# Patient Record
Sex: FEMALE | Race: AMERICAN INDIAN OR ALASKA NATIVE
[De-identification: names, ages, dates, MRNs, and addresses within clinical notes are randomized per-mention and may not be internally consistent; named-entity substitution may affect disease eponyms.]

---

## 2017-07-20 ENCOUNTER — HOSPITAL ENCOUNTER (EMERGENCY)
Dept: HOSPITAL 5 - ED | Age: 22
Discharge: HOME | End: 2017-07-20
Payer: COMMERCIAL

## 2017-07-20 VITALS — DIASTOLIC BLOOD PRESSURE: 64 MMHG | SYSTOLIC BLOOD PRESSURE: 104 MMHG

## 2017-07-20 DIAGNOSIS — N76.4: Primary | ICD-10-CM

## 2017-07-20 PROCEDURE — 99282 EMERGENCY DEPT VISIT SF MDM: CPT

## 2017-07-20 NOTE — EMERGENCY DEPARTMENT REPORT
- General


Chief complaint: Skin/Abscess/Foreign Body


Stated complaint: VAGINAL CYST


Time Seen by Provider: 07/20/17 11:22


Source: patient


Mode of arrival: Ambulatory


Limitations: No Limitations





- History of Present Illness


Initial comments: 





Patient comes in the ER today with complaints of an abscess to the left side of 

her vaginal area.  Patient denies any drainage, body aches, fever, abdominal 

pain.  Patient states that she has had one of these in the same area before and 

that it had to be drained.


MD complaint: abscess/boil


-: days(s) (5)





- Related Data


 Previous Rx's











 Medication  Instructions  Recorded  Last Taken  Type


 


Cyclobenzaprine [Flexeril] 10 mg PO TID PRN #12 tablet 06/13/17 Unknown Rx


 


Ibuprofen [Motrin] 600 mg PO Q8H PRN #25 tablet 06/13/17 Unknown Rx


 


Cephalexin [Keflex] 500 mg PO TID #30 capsule 07/20/17 Unknown Rx


 


Sulfamethoxazole/Trimethoprim 1 each PO BID #20 tablet 07/20/17 Unknown Rx





[Bactrim DS TAB]    


 


traMADol [Ultram] 50 mg PO Q4HR PRN #20 tablet 07/20/17 Unknown Rx











 Allergies











Allergy/AdvReac Type Severity Reaction Status Date / Time


 


No Known Allergies Allergy   Verified 07/20/17 10:00














Abscess Boil HPI





- HPI


Chief Complaint: Skin/Abscess/Foreign Body


Stated Complaint: VAGINAL CYST


Time Seen by Provider: 07/20/17 11:22


Home Medications: 


 Previous Rx's











 Medication  Instructions  Recorded  Last Taken  Type


 


Cyclobenzaprine [Flexeril] 10 mg PO TID PRN #12 tablet 06/13/17 Unknown Rx


 


Ibuprofen [Motrin] 600 mg PO Q8H PRN #25 tablet 06/13/17 Unknown Rx


 


Cephalexin [Keflex] 500 mg PO TID #30 capsule 07/20/17 Unknown Rx


 


Sulfamethoxazole/Trimethoprim 1 each PO BID #20 tablet 07/20/17 Unknown Rx





[Bactrim DS TAB]    


 


traMADol [Ultram] 50 mg PO Q4HR PRN #20 tablet 07/20/17 Unknown Rx











Allergies/Adverse Reactions: 


 Allergies











Allergy/AdvReac Type Severity Reaction Status Date / Time


 


No Known Allergies Allergy   Verified 07/20/17 10:00














ED Review of Systems


ROS: 


Stated complaint: VAGINAL CYST


Other details as noted in HPI





Constitutional: denies: chills, fever


Eyes: denies: eye pain, eye discharge, vision change


ENT: denies: ear pain, throat pain


Respiratory: denies: cough, shortness of breath, wheezing


Cardiovascular: denies: chest pain, palpitations


Endocrine: no symptoms reported


Gastrointestinal: denies: abdominal pain, nausea, diarrhea


Genitourinary: denies: urgency, dysuria, discharge


Musculoskeletal: denies: back pain, joint swelling, arthralgia


Skin: lesions.  denies: rash


Neurological: denies: headache, weakness, paresthesias


Psychiatric: denies: anxiety, depression


Hematological/Lymphatic: denies: easy bleeding, easy bruising





ED Past Medical Hx





- Past Medical History


Hx Asthma: No





- Surgical History


Past Surgical History?: No





- Social History


Smoking Status: Never Smoker


Substance Use Type: None





- Medications


Home Medications: 


 Home Medications











 Medication  Instructions  Recorded  Confirmed  Last Taken  Type


 


Cyclobenzaprine [Flexeril] 10 mg PO TID PRN #12 tablet 06/13/17  Unknown Rx


 


Ibuprofen [Motrin] 600 mg PO Q8H PRN #25 tablet 06/13/17  Unknown Rx


 


Cephalexin [Keflex] 500 mg PO TID #30 capsule 07/20/17  Unknown Rx


 


Sulfamethoxazole/Trimethoprim 1 each PO BID #20 tablet 07/20/17  Unknown Rx





[Bactrim DS TAB]     


 


traMADol [Ultram] 50 mg PO Q4HR PRN #20 tablet 07/20/17  Unknown Rx














ED Physical Exam





- General


Limitations: No Limitations


General appearance: alert, in no apparent distress





- Head


Head exam: Present: atraumatic, normocephalic





- Eye


Eye exam: Present: normal appearance





- ENT


ENT exam: Present: mucous membranes moist





- Neck


Neck exam: Present: normal inspection





- Respiratory


Respiratory exam: Present: normal lung sounds bilaterally.  Absent: respiratory 

distress





- Cardiovascular


Cardiovascular Exam: Present: regular rate, normal rhythm.  Absent: systolic 

murmur, diastolic murmur, rubs, gallop





- GI/Abdominal


GI/Abdominal exam: Present: soft, normal bowel sounds.  Absent: distended, 

tenderness





- 


External exam: Present: swelling (left labial swelling extending laterally into 

inguinal area)





- Extremities Exam


Extremities exam: Present: normal inspection





- Back Exam


Back exam: Present: normal inspection





- Neurological Exam


Neurological exam: Present: alert, oriented X3





- Psychiatric


Psychiatric exam: Present: normal affect, normal mood





- Skin


Skin exam: Present: warm, dry, intact, normal color, other (left labial 

indurated abscess).  Absent: rash





ED Course


 Vital Signs











  07/20/17





  10:01


 


Temperature 98.7 F


 


Pulse Rate 64


 


Respiratory 16





Rate 


 


Blood Pressure 110/67


 


O2 Sat by Pulse 96





Oximetry 














- I & D


  ** Left Lower Vagina


Type of Procedure: Simple


Site: left labia


Blade Size: 11


I & D Procedure: betadine prep, sterile drapes applied, gauze wick placed


Progress: 





Patient tolerated incision and drainage very well without any complications or 

difficulty.  Scant amount of drainage expressed from the area.  Moderate amount 

of serous, bloody drainage expressed from palpable cyst.  Patient bandaged with 

gauze.


Critical care attestation.: 


If time is entered above; I have spent that time in minutes in the direct care 

of this critically ill patient, excluding procedure time.








ED Disposition


Clinical Impression: 


 Abscess of left genital labia





Disposition: DC-01 TO HOME OR SELFCARE


Is pt being admited?: No


Does the pt Need Aspirin: No


Condition: Good


Instructions:  Abscess (ED), Bartholin Cyst (ED), Incision and Drainage (ED)


Prescriptions: 


Cephalexin [Keflex] 500 mg PO TID #30 capsule


Sulfamethoxazole/Trimethoprim [Bactrim DS TAB] 1 each PO BID #20 tablet


traMADol [Ultram] 50 mg PO Q4HR PRN #20 tablet


 PRN Reason: Pain


Referrals: 


PRIMARY CARE,MD [Primary Care Provider] - 3-5 Days


[facility], ER [Other] - 2-3 Days (for wound check and 

packing removal)


Forms:  Work/School Release Form(ED)


Time of Disposition: 12:33

## 2017-08-14 ENCOUNTER — HOSPITAL ENCOUNTER (EMERGENCY)
Dept: HOSPITAL 5 - ED | Age: 22
Discharge: LEFT BEFORE DISCHARGE | End: 2017-08-14
Payer: COMMERCIAL

## 2017-08-14 VITALS — SYSTOLIC BLOOD PRESSURE: 91 MMHG | DIASTOLIC BLOOD PRESSURE: 61 MMHG

## 2017-08-14 DIAGNOSIS — O26.891: Primary | ICD-10-CM

## 2017-08-14 DIAGNOSIS — R10.9: ICD-10-CM

## 2017-08-14 DIAGNOSIS — R11.0: ICD-10-CM

## 2017-08-14 DIAGNOSIS — Z53.21: ICD-10-CM

## 2017-08-14 LAB
ALBUMIN SERPL-MCNC: 3.9 G/DL (ref 3.9–5)
ALBUMIN/GLOB SERPL: 1.4 %
ALP SERPL-CCNC: 52 UNITS/L (ref 35–129)
ALT SERPL-CCNC: 6 UNITS/L (ref 7–56)
ANION GAP SERPL CALC-SCNC: 16 MMOL/L
BACTERIA #/AREA URNS HPF: (no result) /HPF
BASOPHILS NFR BLD AUTO: 0.6 % (ref 0–1.8)
BILIRUB SERPL-MCNC: 0.3 MG/DL (ref 0.1–1.2)
BILIRUB UR QL STRIP: (no result)
BLOOD UR QL VISUAL: (no result)
BUN SERPL-MCNC: 7 MG/DL (ref 7–17)
BUN/CREAT SERPL: 14 %
CALCIUM SERPL-MCNC: 9 MG/DL (ref 8.4–10.2)
CHLORIDE SERPL-SCNC: 102.5 MMOL/L (ref 98–107)
CO2 SERPL-SCNC: 23 MMOL/L (ref 22–30)
EOSINOPHIL NFR BLD AUTO: 2.2 % (ref 0–4.3)
GLUCOSE SERPL-MCNC: 73 MG/DL (ref 65–100)
HCT VFR BLD CALC: 37.6 % (ref 30.3–42.9)
HGB BLD-MCNC: 12.5 GM/DL (ref 10.1–14.3)
KETONES UR STRIP-MCNC: (no result) MG/DL
LEUKOCYTE ESTERASE UR QL STRIP: (no result)
LIPASE SERPL-CCNC: 36 UNITS/L (ref 13–60)
MCH RBC QN AUTO: 31 PG (ref 28–32)
MCHC RBC AUTO-ENTMCNC: 33 % (ref 30–34)
MCV RBC AUTO: 94 FL (ref 79–97)
MUCOUS THREADS #/AREA URNS HPF: (no result) /HPF
NITRITE UR QL STRIP: (no result)
PH UR STRIP: 6 [PH] (ref 5–7)
PLATELET # BLD: 230 K/MM3 (ref 140–440)
POTASSIUM SERPL-SCNC: 3.9 MMOL/L (ref 3.6–5)
PROT SERPL-MCNC: 6.7 G/DL (ref 6.3–8.2)
PROT UR STRIP-MCNC: (no result) MG/DL
RBC # BLD AUTO: 4.02 M/MM3 (ref 3.65–5.03)
RBC #/AREA URNS HPF: 5 /HPF (ref 0–6)
SODIUM SERPL-SCNC: 138 MMOL/L (ref 137–145)
UROBILINOGEN UR-MCNC: 4 MG/DL (ref ?–2)
WBC # BLD AUTO: 8.5 K/MM3 (ref 4.5–11)
WBC #/AREA URNS HPF: 15 /HPF (ref 0–6)

## 2017-08-14 PROCEDURE — 84703 CHORIONIC GONADOTROPIN ASSAY: CPT

## 2017-08-14 PROCEDURE — 84702 CHORIONIC GONADOTROPIN TEST: CPT

## 2017-08-14 PROCEDURE — 76801 OB US < 14 WKS SINGLE FETUS: CPT

## 2017-08-14 PROCEDURE — 85025 COMPLETE CBC W/AUTO DIFF WBC: CPT

## 2017-08-14 PROCEDURE — 86901 BLOOD TYPING SEROLOGIC RH(D): CPT

## 2017-08-14 PROCEDURE — 80053 COMPREHEN METABOLIC PANEL: CPT

## 2017-08-14 PROCEDURE — 86900 BLOOD TYPING SEROLOGIC ABO: CPT

## 2017-08-14 PROCEDURE — 36415 COLL VENOUS BLD VENIPUNCTURE: CPT

## 2017-08-14 PROCEDURE — 83690 ASSAY OF LIPASE: CPT

## 2017-08-14 PROCEDURE — 81001 URINALYSIS AUTO W/SCOPE: CPT

## 2017-08-14 PROCEDURE — 76817 TRANSVAGINAL US OBSTETRIC: CPT

## 2017-08-14 NOTE — ULTRASOUND REPORT
FINAL REPORT



EXAM:  US OB TRANSVAGINAL



HISTORY:  pregnant, pain 



TECHNIQUE:  Real-time sonography was performed of the gravid

uterus transabdominally and images are submitted for

interpretation. 



PRIORS:  None.



FINDINGS:  

There is a gestational sac within the uterus. There is a very

small subchorionic hematoma measuring 9 x 3 x 7 mm. There is a

normal appearing fetal pole measuring 0.71 centimeters for an

estimated gestational age of 6 weeks 4 days. A normal-appearing

yolk sac is identified. The fetal heart is beating at a rate of

124 beats per minute. 



There is a small amount of free pelvic fluid likely physiologic

in nature in nature. The maternal right ovary appears normal

measuring 4.7 x 2.7 x 2.5 cm. There is a normal appearing

follicle in the right ovary. The left ovary appears normal

measuring 1.7 x 2.3 x 1.7 cm. 



IMPRESSION:  

Single live intrauterine gestation, estimated gestational age 6

weeks 4 days for an estimated date of confinement of 04/05/2018. 



Very small subchorionic hematoma. 



Small amount of free pelvic fluid likely physiologic in nature

## 2017-08-14 NOTE — ULTRASOUND REPORT
FINAL REPORT



EXAM:  US OB \T\lt; = 14 WEEKS FETUS



HISTORY:  pregnant, pain 



TECHNIQUE:  Real-time sonography was performed of the gravid

uterus transabdominally and images are submitted for

interpretation. 



PRIORS:  None.



FINDINGS:  

There is a gestational sac within the uterus. There is a very

small subchorionic hematoma measuring 9 x 3 x 7 mm. There is a

normal appearing fetal pole measuring 0.71 centimeters for an

estimated gestational age of 6 weeks 4 days. A normal-appearing

yolk sac is identified. The fetal heart is beating at a rate of

124 beats per minute. 



There is a small amount of free pelvic fluid likely physiologic

in nature in nature. The maternal right ovary appears normal

measuring 4.7 x 2.7 x 2.5 cm. There is a normal appearing

follicle in the right ovary. The left ovary appears normal

measuring 1.7 x 2.3 x 1.7 cm. 



IMPRESSION:  

Single live intrauterine gestation, estimated gestational age 6

weeks 4 days for an estimated date of confinement of 04/05/2018. 



Very small subchorionic hematoma. 



Small amount of free pelvic fluid likely physiologic in nature

## 2017-08-14 NOTE — EMERGENCY DEPARTMENT REPORT
Chief Complaint: Urogenital-Female


Stated Complaint: SHARP ABD PAIN


Time Seen by Provider: 08/14/17 18:08





- HPI


History of Present Illness: 





pt c/o nausea and lower abd pain.  lmp 7-10-17





g 1 p1 





- ROS


Review of Systems: 





+ missed cycle 


+ lower abd pain


+ vaginal discharge - white and itchy 





- Exam


Vital Signs: 


 Vital Signs











  08/14/17





  16:14


 


Temperature 98.5 F


 


Pulse Rate 82


 


Respiratory 15





Rate 


 


Blood Pressure 91/61


 


O2 Sat by Pulse 98





Oximetry 











Physical Exam: 





pt looks well, non toxic


steady gait


gcs 15


gu exam not done in triage 


MSE screening note: 


Focused history and physical exam performed.


Due to findings the following was ordered:





labs us








8/14/17  18:13


PT aware of positive pregnancy test and need for further work up 





ED Medical Decision Making





- Lab Data


Result diagrams: 


 08/14/17 16:38





 08/14/17 16:38





ED Disposition for MSE


Condition: Stable


Referrals: 


PRIMARY CARE,MD [Primary Care Provider] - 3-5 Days

## 2017-09-04 NOTE — ED ELOPEMENT REVIEW
ED Pt Elopement review





- Results review


Lab results: 





 Laboratory Tests











  08/14/17 08/14/17 08/14/17





  16:38 16:38 16:38


 


WBC  8.5  


 


RBC  4.02  


 


Hgb  12.5  


 


Hct  37.6  


 


MCV  94  


 


MCH  31  


 


MCHC  33  


 


RDW  13.3  


 


Plt Count  230  


 


Lymph % (Auto)  29.7  


 


Mono % (Auto)  8.5 H  


 


Eos % (Auto)  2.2  


 


Baso % (Auto)  0.6  


 


Lymph #  2.5  


 


Mono #  0.7  


 


Eos #  0.2  


 


Baso #  0.0  


 


Seg Neutrophils %  59.0  


 


Seg Neutrophils #  5.0  


 


Sodium   138 


 


Potassium   3.9 


 


Chloride   102.5 


 


Carbon Dioxide   23 


 


Anion Gap   16 


 


BUN   7 


 


Creatinine   0.5 L 


 


Estimated GFR   > 60 


 


BUN/Creatinine Ratio   14.00 


 


Glucose   73 


 


Calcium   9.0 


 


Total Bilirubin   0.30 


 


AST   11 


 


ALT   6 L 


 


Alkaline Phosphatase   52 


 


Total Protein   6.7 


 


Albumin   3.9 


 


Albumin/Globulin Ratio   1.4 


 


Lipase   36 


 


HCG, Qual    Positive


 


HCG, Quant   


 


Urine Color   


 


Urine Turbidity   


 


Urine pH   


 


Ur Specific Gravity   


 


Urine Protein   


 


Urine Glucose (UA)   


 


Urine Ketones   


 


Urine Blood   


 


Urine Nitrite   


 


Urine Bilirubin   


 


Urine Urobilinogen   


 


Ur Leukocyte Esterase   


 


Urine WBC (Auto)   


 


Urine RBC (Auto)   


 


U Epithel Cells (Auto)   


 


Urine Bacteria (Auto)   


 


Urine Mucus   


 


Blood Type   














  08/14/17 08/14/17 08/14/17





  16:49 18:17 18:20


 


WBC   


 


RBC   


 


Hgb   


 


Hct   


 


MCV   


 


MCH   


 


MCHC   


 


RDW   


 


Plt Count   


 


Lymph % (Auto)   


 


Mono % (Auto)   


 


Eos % (Auto)   


 


Baso % (Auto)   


 


Lymph #   


 


Mono #   


 


Eos #   


 


Baso #   


 


Seg Neutrophils %   


 


Seg Neutrophils #   


 


Sodium   


 


Potassium   


 


Chloride   


 


Carbon Dioxide   


 


Anion Gap   


 


BUN   


 


Creatinine   


 


Estimated GFR   


 


BUN/Creatinine Ratio   


 


Glucose   


 


Calcium   


 


Total Bilirubin   


 


AST   


 


ALT   


 


Alkaline Phosphatase   


 


Total Protein   


 


Albumin   


 


Albumin/Globulin Ratio   


 


Lipase   


 


HCG, Qual   


 


HCG, Quant   91396 H 


 


Urine Color  Yellow  


 


Urine Turbidity  Clear  


 


Urine pH  6.0  


 


Ur Specific Gravity  1.030  


 


Urine Protein  <15 mg/dl  


 


Urine Glucose (UA)  Neg  


 


Urine Ketones  Neg  


 


Urine Blood  Neg  


 


Urine Nitrite  Neg  


 


Urine Bilirubin  Neg  


 


Urine Urobilinogen  4.0  


 


Ur Leukocyte Esterase  Lg  


 


Urine WBC (Auto)  15.0 H  


 


Urine RBC (Auto)  5.0  


 


U Epithel Cells (Auto)  4.0  


 


Urine Bacteria (Auto)  1+  


 


Urine Mucus  2+  


 


Blood Type    AB POSITIVE














- Call Back decision


Pt Call Back Decision: Pt to F/U with PMD

## 2019-01-02 ENCOUNTER — HOSPITAL ENCOUNTER (EMERGENCY)
Dept: HOSPITAL 5 - ED | Age: 24
Discharge: HOME | End: 2019-01-02
Payer: MEDICAID

## 2019-01-02 VITALS — SYSTOLIC BLOOD PRESSURE: 84 MMHG | DIASTOLIC BLOOD PRESSURE: 53 MMHG

## 2019-01-02 DIAGNOSIS — Z3A.01: ICD-10-CM

## 2019-01-02 DIAGNOSIS — O21.9: Primary | ICD-10-CM

## 2019-01-02 DIAGNOSIS — J45.909: ICD-10-CM

## 2019-01-02 DIAGNOSIS — O99.511: ICD-10-CM

## 2019-01-02 LAB
ALBUMIN SERPL-MCNC: 3.6 G/DL (ref 3.9–5)
ALT SERPL-CCNC: 7 UNITS/L (ref 7–56)
BACTERIA #/AREA URNS HPF: (no result) /HPF
BASOPHILS # (AUTO): 0 K/MM3 (ref 0–0.1)
BASOPHILS NFR BLD AUTO: 0.3 % (ref 0–1.8)
BILIRUB UR QL STRIP: (no result)
BLOOD UR QL VISUAL: (no result)
BUN SERPL-MCNC: 8 MG/DL (ref 7–17)
BUN/CREAT SERPL: 20 %
CALCIUM SERPL-MCNC: 8.4 MG/DL (ref 8.4–10.2)
EOSINOPHIL # BLD AUTO: 0.1 K/MM3 (ref 0–0.4)
EOSINOPHIL NFR BLD AUTO: 1.4 % (ref 0–4.3)
HCT VFR BLD CALC: 33.9 % (ref 30.3–42.9)
HEMOLYSIS INDEX: 7
HGB BLD-MCNC: 11.5 GM/DL (ref 10.1–14.3)
LYMPHOCYTES # BLD AUTO: 1.5 K/MM3 (ref 1.2–5.4)
LYMPHOCYTES NFR BLD AUTO: 23 % (ref 13.4–35)
MCHC RBC AUTO-ENTMCNC: 34 % (ref 30–34)
MCV RBC AUTO: 93 FL (ref 79–97)
MONOCYTES # (AUTO): 0.5 K/MM3 (ref 0–0.8)
MONOCYTES % (AUTO): 8.1 % (ref 0–7.3)
MUCOUS THREADS #/AREA URNS HPF: (no result) /HPF
PH UR STRIP: 6 [PH] (ref 5–7)
PLATELET # BLD: 222 K/MM3 (ref 140–440)
PROT UR STRIP-MCNC: (no result) MG/DL
RBC # BLD AUTO: 3.64 M/MM3 (ref 3.65–5.03)
RBC #/AREA URNS HPF: 2 /HPF (ref 0–6)
UROBILINOGEN UR-MCNC: 2 MG/DL (ref ?–2)
WBC #/AREA URNS HPF: 2 /HPF (ref 0–6)

## 2019-01-02 PROCEDURE — 81001 URINALYSIS AUTO W/SCOPE: CPT

## 2019-01-02 PROCEDURE — 83690 ASSAY OF LIPASE: CPT

## 2019-01-02 PROCEDURE — 85025 COMPLETE CBC W/AUTO DIFF WBC: CPT

## 2019-01-02 PROCEDURE — 76801 OB US < 14 WKS SINGLE FETUS: CPT

## 2019-01-02 PROCEDURE — 80053 COMPREHEN METABOLIC PANEL: CPT

## 2019-01-02 PROCEDURE — 99284 EMERGENCY DEPT VISIT MOD MDM: CPT

## 2019-01-02 PROCEDURE — 76817 TRANSVAGINAL US OBSTETRIC: CPT

## 2019-01-02 PROCEDURE — 84702 CHORIONIC GONADOTROPIN TEST: CPT

## 2019-01-02 PROCEDURE — 96374 THER/PROPH/DIAG INJ IV PUSH: CPT

## 2019-01-02 PROCEDURE — 96361 HYDRATE IV INFUSION ADD-ON: CPT

## 2019-01-02 PROCEDURE — 36415 COLL VENOUS BLD VENIPUNCTURE: CPT

## 2019-01-02 NOTE — EMERGENCY DEPARTMENT REPORT
HPI





- General


Chief Complaint: Nausea/Vomiting/Diarrhea


Time Seen by Provider: 01/02/19 10:55





- HPI


HPI: 





Room 18





The patient is a 23-year-old female presenting with a chief complaint abdominal 

pain nausea vomiting.  The patient states she is pregnant and her last menstrual

cycle occurred 11/22/2018.  The patient states she has not yet seen an OB/GYN fo

r this pregnancy.  Patient states for the past 2-3 week she's had intermittent 

lower abdominal pain associated with nausea and vomiting.  Patient denies 

diarrhea, fever, dysuria or hematuria.  Patient states her breasts have been 

tender





Location: [See above]


Duration: 2-3 weeks


Quality: Pain


Severity: Moderate


Modifying factors: [see above]


Context: [see above]


Mode of transportation: [not driving]





ED Past Medical Hx





- Past Medical History


Hx Asthma: Yes





- Surgical History


Past Surgical History?: No





- Family History


Family history: no significant





- Social History


Smoking Status: Never Smoker


Substance Use Type: None (denies illicit drug use)





- Medications


Home Medications: 


                                Home Medications











 Medication  Instructions  Recorded  Confirmed  Last Taken  Type


 


Nitrofurantoin Monohyd/M-Cryst 100 mg PO Q12H #20 capsule 10/10/18  Unknown Rx





[Macrobid 100 mg Capsule]     














ED Review of Systems


ROS: 


Stated complaint: NAUSEA/STOMACH/HEADACHES


Other details as noted in HPI





Constitutional: denies: fever


Eyes: denies: eye pain


ENT: denies: throat pain


Respiratory: no symptoms reported


Cardiovascular: denies: chest pain


Endocrine: no symptoms reported


Gastrointestinal: abdominal pain, nausea, vomiting, constipation.  denies: 

diarrhea


Genitourinary: denies: dysuria, hematuria


Musculoskeletal: denies: back pain


Neurological: denies: headache





Physical Exam





- Physical Exam


Vital Signs: 


                                   Vital Signs











  01/02/19





  10:36


 


Temperature 98.3 F


 


Pulse Rate 76


 


Respiratory 16





Rate 


 


Blood Pressure 100/42


 


O2 Sat by Pulse 100





Oximetry 











Physical Exam: 





GENERAL: The patient is well-developed well-nourished female lying on stretcher 

not appearing to be in acute distress. []


HEENT: Normocephalic.  Atraumatic.  Extraocular motions are intact.  Patient has

 moist mucous membranes.


NECK: Supple.  Trachea midline


CHEST/LUNGS: Clear to auscultation.  There is no respiratory distress noted.


HEART/CARDIOVASCULAR: Regular.  There is no tachycardia.  There is no gallop rub

 or murmur.


ABDOMEN: Abdomen is soft, with mild discomfort to palpation in the right lower 

quadrant and suprapubic region.  Patient has normal bowel sounds.  There is no 

abdominal distention.


SKIN: There is no rash.  There is no edema.  There is no diaphoresis.


NEURO: The patient is awake, alert, and oriented.  The patient is cooperative. 

The patient has normal speech


MUSCULOSKELETAL:   There is no evidence of acute injury.





ED Course


                                   Vital Signs











  01/02/19





  10:36


 


Temperature 98.3 F


 


Pulse Rate 76


 


Respiratory 16





Rate 


 


Blood Pressure 100/42


 


O2 Sat by Pulse 100





Oximetry 














ED Medical Decision Making





- Lab Data


Result diagrams: 


                                 01/02/19 11:03





                                 01/02/19 11:03





                                Laboratory Tests











  01/02/19 01/02/19 01/02/19





  11:03 11:03 11:03


 


WBC  6.6  


 


RBC  3.64 L  


 


Hgb  11.5  


 


Hct  33.9  


 


MCV  93  


 


MCH  32  


 


MCHC  34  


 


RDW  12.8 L  


 


Plt Count  222  


 


Lymph % (Auto)  23.0  


 


Mono % (Auto)  8.1 H  


 


Eos % (Auto)  1.4  


 


Baso % (Auto)  0.3  


 


Lymph #  1.5  


 


Mono #  0.5  


 


Eos #  0.1  


 


Baso #  0.0  


 


Seg Neutrophils %  67.2  


 


Seg Neutrophils #  4.4  


 


Sodium   136 L 


 


Potassium   3.7 


 


Chloride   105.1 


 


Carbon Dioxide   23 


 


Anion Gap   12 


 


BUN   8 


 


Creatinine   0.4 L 


 


Estimated GFR   > 60 


 


BUN/Creatinine Ratio   20 


 


Glucose   86 


 


Calcium   8.4 


 


Total Bilirubin   0.30 


 


AST   12 


 


ALT   7 


 


Alkaline Phosphatase   48 


 


Total Protein   5.8 L 


 


Albumin   3.6 L 


 


Albumin/Globulin Ratio   1.6 


 


Lipase   32 


 


HCG, Quant    97991 H


 


Urine Color   


 


Urine Turbidity   


 


Urine pH   


 


Ur Specific Gravity   


 


Urine Protein   


 


Urine Glucose (UA)   


 


Urine Ketones   


 


Urine Blood   


 


Urine Nitrite   


 


Urine Bilirubin   


 


Urine Urobilinogen   


 


Ur Leukocyte Esterase   


 


Urine WBC (Auto)   


 


Urine RBC (Auto)   


 


U Epithel Cells (Auto)   


 


Urine Bacteria (Auto)   


 


Urine Mucus   














  01/02/19





  11:27


 


WBC 


 


RBC 


 


Hgb 


 


Hct 


 


MCV 


 


MCH 


 


MCHC 


 


RDW 


 


Plt Count 


 


Lymph % (Auto) 


 


Mono % (Auto) 


 


Eos % (Auto) 


 


Baso % (Auto) 


 


Lymph # 


 


Mono # 


 


Eos # 


 


Baso # 


 


Seg Neutrophils % 


 


Seg Neutrophils # 


 


Sodium 


 


Potassium 


 


Chloride 


 


Carbon Dioxide 


 


Anion Gap 


 


BUN 


 


Creatinine 


 


Estimated GFR 


 


BUN/Creatinine Ratio 


 


Glucose 


 


Calcium 


 


Total Bilirubin 


 


AST 


 


ALT 


 


Alkaline Phosphatase 


 


Total Protein 


 


Albumin 


 


Albumin/Globulin Ratio 


 


Lipase 


 


HCG, Quant 


 


Urine Color  Yellow


 


Urine Turbidity  Clear


 


Urine pH  6.0


 


Ur Specific Gravity  1.029


 


Urine Protein  <15 mg/dl


 


Urine Glucose (UA)  Neg


 


Urine Ketones  Neg


 


Urine Blood  Neg


 


Urine Nitrite  Neg


 


Urine Bilirubin  Neg


 


Urine Urobilinogen  2.0


 


Ur Leukocyte Esterase  Tr


 


Urine WBC (Auto)  2.0


 


Urine RBC (Auto)  2.0


 


U Epithel Cells (Auto)  3.0


 


Urine Bacteria (Auto)  1+


 


Urine Mucus  Few














- Differential Diagnosis


pregnancy, UTI, ectopic pregnancy


Critical care attestation.: 


If time is entered above; I have spent that time in minutes in the direct care 

of this critically ill patient, excluding procedure time.








ED Disposition


Clinical Impression: 


 Pregnancy, Abdominal pain, Fetal bradycardia





Disposition: DC-01 TO HOME OR SELFCARE


Is pt being admited?: No


Does the pt Need Aspirin: No


Condition: Stable


Additional Instructions: 


Return to the emergency department immediately should you develop worsening 

symptoms, fever, inability to tolerate food or liquid or any other concerns.


Referrals: 


PRIMARY MD THO [Primary Care Provider] - 3-5 Days


RAMONE FLORIAN MD [Staff Physician] - 3-5 Days (Dr. Florian is an OB/GYN.  

Please follow-up with her for further evaluation)


Time of Disposition: 14:05

## 2019-01-02 NOTE — ULTRASOUND REPORT
ULTRASOUND OB LESS THAN 14 WEEKS FETUS

ULTRASOUND OB TRANSVAGINAL



History: Abdominal pain during pregnancy, beta hCG level of 34,605.



Technique: Transabdominal and transvaginal ultrasound imaging.



Comparison: None.



Findings: The uterus is retroflexed. The uterus measures 9.8 x 6.7 x 

7.3 cm. No uterine mass is appreciated. An intrauterine gestational sac 

containing a small fetal pole and yolk sac is identified. Fetal heart 

rate measures 62 beats per minute. No subchorionic hemorrhage is 

detected. The cervix is unremarkable.



Crown rump length measures 3.8 mm which correlates with a 6 week 0 day 

pregnancy.



The right ovary measures 3.9 x 2.8 x 3.7 cm. A 2.3 cm cyst is 

identified in the right ovary.



The left ovary measures 3.4 x 2.5 x 3.1 cm. A 2.7 cm cyst is identified 

in the left ovary.



Impression:

Viable, single intrauterine pregnancy as described. Fetal heart rate is 

slightly low measuring 62 beats per minutes.

Bilateral ovarian cysts.

## 2019-02-16 ENCOUNTER — HOSPITAL ENCOUNTER (EMERGENCY)
Dept: HOSPITAL 5 - ED | Age: 24
Discharge: HOME | End: 2019-02-16
Payer: COMMERCIAL

## 2019-02-16 VITALS — SYSTOLIC BLOOD PRESSURE: 110 MMHG | DIASTOLIC BLOOD PRESSURE: 58 MMHG

## 2019-02-16 DIAGNOSIS — O23.41: ICD-10-CM

## 2019-02-16 DIAGNOSIS — O99.511: Primary | ICD-10-CM

## 2019-02-16 DIAGNOSIS — Z3A.12: ICD-10-CM

## 2019-02-16 LAB
BACTERIA #/AREA URNS HPF: (no result) /HPF
BAND NEUTROPHILS # (MANUAL): 0 K/MM3
BILIRUB UR QL STRIP: (no result)
BLOOD UR QL VISUAL: (no result)
HCT VFR BLD CALC: 38 % (ref 30.3–42.9)
HGB BLD-MCNC: 12.8 GM/DL (ref 10.1–14.3)
MCHC RBC AUTO-ENTMCNC: 34 % (ref 30–34)
MCV RBC AUTO: 93 FL (ref 79–97)
MUCOUS THREADS #/AREA URNS HPF: (no result) /HPF
MYELOCYTES # (MANUAL): 0 K/MM3
PH UR STRIP: 7 [PH] (ref 5–7)
PLATELET # BLD: 238 K/MM3 (ref 140–440)
PROMYELOCYTES # (MANUAL): 0 K/MM3
RBC # BLD AUTO: 4.11 M/MM3 (ref 3.65–5.03)
RBC #/AREA URNS HPF: 8 /HPF (ref 0–6)
TOTAL CELLS COUNTED BLD: 100
UROBILINOGEN UR-MCNC: 4 MG/DL (ref ?–2)
WBC #/AREA URNS HPF: 28 /HPF (ref 0–6)

## 2019-02-16 PROCEDURE — 86900 BLOOD TYPING SEROLOGIC ABO: CPT

## 2019-02-16 PROCEDURE — 85025 COMPLETE CBC W/AUTO DIFF WBC: CPT

## 2019-02-16 PROCEDURE — 81001 URINALYSIS AUTO W/SCOPE: CPT

## 2019-02-16 PROCEDURE — 96372 THER/PROPH/DIAG INJ SC/IM: CPT

## 2019-02-16 PROCEDURE — 86850 RBC ANTIBODY SCREEN: CPT

## 2019-02-16 PROCEDURE — 84702 CHORIONIC GONADOTROPIN TEST: CPT

## 2019-02-16 PROCEDURE — 99283 EMERGENCY DEPT VISIT LOW MDM: CPT

## 2019-02-16 PROCEDURE — 86901 BLOOD TYPING SEROLOGIC RH(D): CPT

## 2019-02-16 PROCEDURE — 85007 BL SMEAR W/DIFF WBC COUNT: CPT

## 2019-02-16 PROCEDURE — 36415 COLL VENOUS BLD VENIPUNCTURE: CPT

## 2019-02-16 NOTE — EMERGENCY DEPARTMENT REPORT
Blank Doc





- Documentation


Documentation: 





This is a 23 y.o. female that presents with flu-like and 12 weeks pregnant.  She

also complains of vaginal spotting that started last night.  LMP 18, 

A0. 





This initial assessment diagnostic orders/clinical plan/treatment(s) is/are 

subject to change based on patient's health status, clinical progression and re-

assessment by fellow clinical providers in the ED.  Further treatment and workup

at subsequent clinical providers discretion.  Patient/guardians urged not to 

elope from ED s their condition may be serious if not clinically assessed and 

managed.  Initial orders include:





1- Labs





Fast track for further evaluation.

## 2019-02-16 NOTE — EMERGENCY DEPARTMENT REPORT
ED General Adult HPI





- General


Chief complaint: Upper Respiratory Infection


Stated complaint: 12WKS PREG/FLU LIKE SYMPTOMS


Time Seen by Provider: 19 16:49


Source: patient


Mode of arrival: Ambulatory


Limitations: No Limitations





- History of Present Illness


Initial comments: 


Pt is a 22 y/o aaf 12 weeks pregnant G2, P1, A0 with hx of asthma who presents 

for abd pain cramping and cough with noc wheezing abd pain described as cramping

with vaginal spotting last vaginal spotting yesterday , there is no n/v no fever

or chills pt does endorse urinary frequency no urgency no hematuria no back pain

no hx of renal stones, uri symptoms include productive cough yellow rhinorrhea 

noc wheezing no fever noted in triage symptoms are exacerbated by environmental 

exposure symptoms are relieved by rest.  





Onset/Timin


-: days(s)


Location: abdomen (superpubic )


Radiation: non-radiation


Severity scale (0 -10): 2


Quality: other (cramping )


Consistency: intermittent


Improves with: rest


Worsens with: movement


Associated Symptoms: cough, shortness of breath


Treatments Prior to Arrival: none





- Related Data


                                  Previous Rx's











 Medication  Instructions  Recorded  Last Taken  Type


 


Nitrofurantoin Monohyd/M-Cryst 100 mg PO Q12H #20 capsule 10/10/18 Unknown Rx





[Macrobid 100 mg Capsule]    


 


ALBUTEROL Inhaler(NF) [VENTOLIN 2 puff IH Q4H PRN #1 inha 19 Unknown Rx





Inhaler(NF)]    


 


Acetaminophen [Tylenol] 650 mg PO QID PRN #30 capsule 19 Unknown Rx


 


Azithromycin [Zithromax Z-MENDOZA] 250 mg PO DAILY #6 tab 19 Unknown Rx


 


guaiFENesin 400 mg PO Q4H PRN #24 tablet 19 Unknown Rx


 


predniSONE [Deltasone] 40 mg PO QDAY 5 Days #10 tab 19 Unknown Rx











                                    Allergies











Allergy/AdvReac Type Severity Reaction Status Date / Time


 


No Known Allergies Allergy   Unverified 10/09/18 20:50














ED Review of Systems


ROS: 


Stated complaint: 12WKS PREG/FLU LIKE SYMPTOMS


Other details as noted in HPI





Constitutional: denies: chills, fever


Eyes: denies: eye pain, eye discharge, vision change


ENT: ear pain, congestion


Respiratory: cough, shortness of breath, wheezing


Cardiovascular: denies: chest pain, palpitations


Endocrine: no symptoms reported


Gastrointestinal: abdominal pain


Genitourinary: frequency.  denies: urgency, dysuria, hematuria, discharge, 

abnormal menses, dyspareunia


Musculoskeletal: denies: back pain, joint swelling, arthralgia


Skin: denies: rash, lesions


Neurological: denies: headache, weakness, paresthesias


Psychiatric: denies: anxiety, depression


Hematological/Lymphatic: denies: easy bleeding, easy bruising





ED Past Medical Hx





- Past Medical History


Hx Asthma: Yes





- Surgical History


Past Surgical History?: No





- Social History


Smoking Status: Never Smoker


Substance Use Type: Other





- Medications


Home Medications: 


                                Home Medications











 Medication  Instructions  Recorded  Confirmed  Last Taken  Type


 


Nitrofurantoin Monohyd/M-Cryst 100 mg PO Q12H #20 capsule 10/10/18  Unknown Rx





[Macrobid 100 mg Capsule]     


 


ALBUTEROL Inhaler(NF) [VENTOLIN 2 puff IH Q4H PRN #1 inha 19  Unknown Rx





Inhaler(NF)]     


 


Acetaminophen [Tylenol] 650 mg PO QID PRN #30 capsule 19  Unknown Rx


 


Azithromycin [Zithromax Z-MENDOZA] 250 mg PO DAILY #6 tab 19  Unknown Rx


 


guaiFENesin 400 mg PO Q4H PRN #24 tablet 19  Unknown Rx


 


predniSONE [Deltasone] 40 mg PO QDAY 5 Days #10 tab 19  Unknown Rx














ED Physical Exam





- General


Limitations: No Limitations


General appearance: alert, in no apparent distress





- Head


Head exam: Present: atraumatic, normocephalic





- Eye


Eye exam: Present: normal appearance, PERRL, EOMI


Pupils: Present: normal accommodation





- ENT


ENT exam: Present: normal orophraynx, mucous membranes moist, TM's normal 

bilaterally, normal external ear exam





- Expanded ENT Exam


  ** Expanded


Throat exam: Positive: tonsillar erythema, other (uvula midline clear post nasal

 drip noted no stridor no wheezing ).  Negative: tonsillomegaly, tonsillar 

exudate, R peritonsillar mass, L peritonsillar mass





- Neck


Neck exam: Present: normal inspection, full ROM.  Absent: tenderness, 

meningismus, lymphadenopathy, thyromegaly





- Respiratory


Respiratory exam: Present: normal lung sounds bilaterally.  Absent: respiratory 

distress, wheezes, stridor, chest wall tenderness, prolonged expiratory





- Cardiovascular


Cardiovascular Exam: Present: regular rate, normal rhythm, normal heart sounds. 

 Absent: systolic murmur, diastolic murmur, rubs, gallop





- GI/Abdominal


GI/Abdominal exam: Present: soft, normal bowel sounds.  Absent: distended, 

tenderness, guarding, rebound, bruit, hernia





- Rectal


Rectal exam: Present: deferred





- 


External exam: Present: other (exam deferred per patient )





- Extremities Exam


Extremities exam: Present: normal inspection, full ROM, normal capillary refill.

  Absent: tenderness, pedal edema, joint swelling





- Back Exam


Back exam: Present: normal inspection, full ROM.  Absent: tenderness, CVA 

tenderness (R), CVA tenderness (L), muscle spasm, paraspinal tenderness, rash 

noted





- Neurological Exam


Neurological exam: Present: alert, oriented X3, CN II-XII intact, normal gait, 

reflexes normal





- Psychiatric


Psychiatric exam: Present: normal affect, normal mood





- Skin


Skin exam: Present: warm, dry, intact, normal color.  Absent: rash





ED Course


                                   Vital Signs











  19





  16:21 16:48 20:33


 


Temperature 98 F 98 F 


 


Pulse Rate 103 H 103 H 


 


Respiratory 18 18 16





Rate   


 


Blood Pressure  108/46 


 


Blood Pressure 108/46  





[Right]   


 


O2 Sat by Pulse 100 100 





Oximetry   














ED Medical Decision Making





- Lab Data


Result diagrams: 


                                 19 16:59











Labs











  19





  16:59 16:59 16:59


 


WBC  8.9  


 


RBC  4.11  


 


Hgb  12.8  


 


Hct  38.0  


 


MCV  93  


 


MCH  31  


 


MCHC  34  


 


RDW  12.8 L  


 


Plt Count  238  


 


Add Manual Diff  Complete  


 


Total Counted  100  


 


Seg Neuts % (Manual)  81.0 H  


 


Band Neutrophils %  0  


 


Lymphocytes % (Manual)  14.0  


 


Reactive Lymphs % (Man)  0  


 


Monocytes % (Manual)  0  


 


Eosinophils % (Manual)  5.0 H  


 


Basophils % (Manual)  0  


 


Metamyelocytes %  0  


 


Myelocytes %  0  


 


Promyelocytes %  0  


 


Blast Cells %  0  


 


Nucleated RBC %  Not Reportable  


 


Seg Neutrophils # Man  7.2  


 


Band Neutrophils #  0.0  


 


Lymphocytes # (Manual)  1.2  


 


Abs React Lymphs (Man)  0.0  


 


Monocytes # (Manual)  0.0  


 


Eosinophils # (Manual)  0.4  


 


Basophils # (Manual)  0.0  


 


Metamyelocytes #  0.0  


 


Myelocytes #  0.0  


 


Promyelocytes #  0.0  


 


Blast Cells #  0.0  


 


WBC Morphology  Not Reportable  


 


Hypersegmented Neuts  Not Reportable  


 


Hyposegmented Neuts  Not Reportable  


 


Hypogranular Neuts  Not Reportable  


 


Smudge Cells  Not Reportable  


 


Toxic Granulation  Not Reportable  


 


Toxic Vacuolation  Not Reportable  


 


Dohle Bodies  Not Reportable  


 


Pelger-Huet Anomaly  Not Reportable  


 


Kody Rods  Not Reportable  


 


Platelet Estimate  Appears normal  


 


Clumped Platelets  Not Reportable  


 


Plt Clumps, EDTA  Not Reportable  


 


Large Platelets  Not Reportable  


 


Giant Platelets  Not Reportable  


 


Platelet Satelliting  Not Reportable  


 


Plt Morphology Comment  Not Reportable  


 


RBC Morphology  Normal  


 


Dimorphic RBCs  Not Reportable  


 


Polychromasia  Not Reportable  


 


Hypochromasia  Not Reportable  


 


Poikilocytosis  Not Reportable  


 


Anisocytosis  Not Reportable  


 


Microcytosis  Not Reportable  


 


Macrocytosis  Not Reportable  


 


Spherocytes  Not Reportable  


 


Pappenheimer Bodies  Not Reportable  


 


Sickle Cells  Not Reportable  


 


Target Cells  Not Reportable  


 


Tear Drop Cells  Not Reportable  


 


Ovalocytes  Not Reportable  


 


Helmet Cells  Not Reportable  


 


[place] Bodies  Not Reportable  


 


Cabot Rings  Not Reportable  


 


[place] Cells  Not Reportable  


 


Bite Cells  Not Reportable  


 


Crenated Cell  Not Reportable  


 


Elliptocytes  Not Reportable  


 


Acanthocytes (Spur)  Not Reportable  


 


Rouleaux  Not Reportable  


 


Hemoglobin C Crystals  Not Reportable  


 


Schistocytes  Not Reportable  


 


Malaria parasites  Not Reportable  


 


Paulino Bodies  Not Reportable  


 


Hem Pathologist Commnt  No  


 


HCG, Quant   87586 H 


 


Urine Color   


 


Urine Turbidity   


 


Urine pH   


 


Ur Specific Gravity   


 


Urine Protein   


 


Urine Glucose (UA)   


 


Urine Ketones   


 


Urine Blood   


 


Urine Nitrite   


 


Urine Bilirubin   


 


Urine Urobilinogen   


 


Ur Leukocyte Esterase   


 


Urine WBC (Auto)   


 


Urine RBC (Auto)   


 


U Epithel Cells (Auto)   


 


Urine Bacteria (Auto)   


 


Urine Mucus   


 


Urine Yeast (Budding)   


 


Blood Type    AB POSITIVE


 


Antibody Screen    Negative














  19





  17:21


 


WBC 


 


RBC 


 


Hgb 


 


Hct 


 


MCV 


 


MCH 


 


MCHC 


 


RDW 


 


Plt Count 


 


Add Manual Diff 


 


Total Counted 


 


Seg Neuts % (Manual) 


 


Band Neutrophils % 


 


Lymphocytes % (Manual) 


 


Reactive Lymphs % (Man) 


 


Monocytes % (Manual) 


 


Eosinophils % (Manual) 


 


Basophils % (Manual) 


 


Metamyelocytes % 


 


Myelocytes % 


 


Promyelocytes % 


 


Blast Cells % 


 


Nucleated RBC % 


 


Seg Neutrophils # Man 


 


Band Neutrophils # 


 


Lymphocytes # (Manual) 


 


Abs React Lymphs (Man) 


 


Monocytes # (Manual) 


 


Eosinophils # (Manual) 


 


Basophils # (Manual) 


 


Metamyelocytes # 


 


Myelocytes # 


 


Promyelocytes # 


 


Blast Cells # 


 


WBC Morphology 


 


Hypersegmented Neuts 


 


Hyposegmented Neuts 


 


Hypogranular Neuts 


 


Smudge Cells 


 


Toxic Granulation 


 


Toxic Vacuolation 


 


Dohle Bodies 


 


Pelger-Huet Anomaly 


 


Kody Rods 


 


Platelet Estimate 


 


Clumped Platelets 


 


Plt Clumps, EDTA 


 


Large Platelets 


 


Giant Platelets 


 


Platelet Satelliting 


 


Plt Morphology Comment 


 


RBC Morphology 


 


Dimorphic RBCs 


 


Polychromasia 


 


Hypochromasia 


 


Poikilocytosis 


 


Anisocytosis 


 


Microcytosis 


 


Macrocytosis 


 


Spherocytes 


 


Pappenheimer Bodies 


 


Sickle Cells 


 


Target Cells 


 


Tear Drop Cells 


 


Ovalocytes 


 


Helmet Cells 


 


[place] Bodies 


 


Cabot Rings 


 


Vignesh Cells 


 


Bite Cells 


 


Crenated Cell 


 


Elliptocytes 


 


Acanthocytes (Spur) 


 


Rouleaux 


 


Hemoglobin C Crystals 


 


Schistocytes 


 


Malaria parasites 


 


Paulino Bodies 


 


Hem Pathologist Commnt 


 


HCG, Quant 


 


Urine Color  Yellow


 


Urine Turbidity  Clear


 


Urine pH  7.0


 


Ur Specific Gravity  1.031 H


 


Urine Protein  30 mg/dl


 


Urine Glucose (UA)  Neg


 


Urine Ketones  Neg


 


Urine Blood  Neg


 


Urine Nitrite  Neg


 


Urine Bilirubin  Neg


 


Urine Urobilinogen  4.0


 


Ur Leukocyte Esterase  Mod


 


Urine WBC (Auto)  28.0 H


 


Urine RBC (Auto)  8.0


 


U Epithel Cells (Auto)  4.0


 


Urine Bacteria (Auto)  1+


 


Urine Mucus  1+


 


Urine Yeast (Budding)  Few


 


Blood Type 


 


Antibody Screen 














- Medical Decision Making


pt dna for US OB, called pt left message to return to ed, for completion of 

evaluation and  rx for bronchitis, plan: dc with tx for bronchitis, will 

prepare discharge rx and instructions including follow up with pcp and obgyn, pt

 advised provider last spotting was yesterday , there is no abd pain at this 

time, no fever no chills no n/v pt is tolerating po intake , will dc with rx for

 albuterol inhaler, prednisone, tylenol, loratadine, zpack for uti, given 

rocephin 1gm im in ed,  





Critical care attestation.: 


If time is entered above; I have spent that time in minutes in the direct care 

of this critically ill patient, excluding procedure time.








ED Disposition


Clinical Impression: 


 Genitourinary tract infection during pregnancy in first trimester, Bronchitis





Disposition: DC-01 TO HOME OR SELFCARE


Is pt being admited?: No


Does the pt Need Aspirin: No


Condition: Stable


Instructions:  Chronic Bronchitis (ED)


Prescriptions: 


Acetaminophen [Tylenol] 650 mg PO QID PRN #30 capsule


 PRN Reason: pain


ALBUTEROL Inhaler(NF) [VENTOLIN Inhaler(NF)] 2 puff IH Q4H PRN #1 inha


 PRN Reason: shortness of breath wheezing 


Azithromycin [Zithromax Z-MENDOZA] 250 mg PO DAILY #6 tab


guaiFENesin 400 mg PO Q4H PRN #24 tablet


 PRN Reason: Cough


predniSONE [Deltasone] 40 mg PO QDAY 5 Days #10 tab


Referrals: 


CARLOS EDUARDO ROBERTSON MD [Staff Physician] - 3-5 Days


MAUREEN DALEY [Primary Care Provider] - 3-5 Days


Forms:  Work/School Release Form(ED)


Time of Disposition: 21:06

## 2020-02-06 ENCOUNTER — HOSPITAL ENCOUNTER (EMERGENCY)
Dept: HOSPITAL 5 - ED | Age: 25
Discharge: HOME | End: 2020-02-06
Payer: COMMERCIAL

## 2020-02-06 VITALS — SYSTOLIC BLOOD PRESSURE: 126 MMHG | DIASTOLIC BLOOD PRESSURE: 82 MMHG

## 2020-02-06 DIAGNOSIS — Z98.890: ICD-10-CM

## 2020-02-06 DIAGNOSIS — Z79.899: ICD-10-CM

## 2020-02-06 DIAGNOSIS — R10.9: ICD-10-CM

## 2020-02-06 DIAGNOSIS — B96.89: ICD-10-CM

## 2020-02-06 DIAGNOSIS — J45.909: ICD-10-CM

## 2020-02-06 DIAGNOSIS — N76.0: ICD-10-CM

## 2020-02-06 DIAGNOSIS — L73.9: Primary | ICD-10-CM

## 2020-02-06 DIAGNOSIS — Z79.1: ICD-10-CM

## 2020-02-06 LAB
BILIRUB UR QL STRIP: (no result)
BLOOD UR QL VISUAL: (no result)
MUCOUS THREADS #/AREA URNS HPF: (no result) /HPF
PH UR STRIP: 6 [PH] (ref 5–7)
PROT UR STRIP-MCNC: (no result) MG/DL
RBC #/AREA URNS HPF: 4 /HPF (ref 0–6)
UROBILINOGEN UR-MCNC: 2 MG/DL (ref ?–2)
WBC #/AREA URNS HPF: 2 /HPF (ref 0–6)

## 2020-02-06 PROCEDURE — 87210 SMEAR WET MOUNT SALINE/INK: CPT

## 2020-02-06 PROCEDURE — 81001 URINALYSIS AUTO W/SCOPE: CPT

## 2020-02-06 PROCEDURE — 81025 URINE PREGNANCY TEST: CPT

## 2020-02-06 PROCEDURE — 87591 N.GONORRHOEAE DNA AMP PROB: CPT

## 2020-02-06 NOTE — EMERGENCY DEPARTMENT REPORT
Chief Complaint: Abdominal Pain


Stated Complaint: ABD PAIN





- HPI


History of Present Illness: 





4 days of lower abd/pelvic pain.


Vaginal rash that burns.


No dysuria, vag bleeding or discharge.





Gave birth 5 months ago with vaginal delivery. IUD in place now.





No PMHX.





- ROS


Review of Systems: 





+Lower abd/pelvic pain, burning vaginal rash


-Vaginal d/c, dysuria, fever, back pain








- Exam


Vital Signs: 


                                   Vital Signs











  02/06/20





  14:24


 


Temperature 98.7 F


 


Pulse Rate 105 H


 


Respiratory 18





Rate 


 


Blood Pressure 126/82


 


O2 Sat by Pulse 96





Oximetry 











Physical Exam: 





AAO x 3. Ambulatory into triage. No acute distress. 


MSE screening note: 


Focused history and physical exam performed.


Due to findings the following was ordered:





U/A, Upreg





To fast track.





May need pelvic or at least visual examination of rash


Patient discussed with doctor:: JACQUELYN LAZARO





ED Disposition for MSE


Condition: Stable


Instructions:  Abdominal Pain (ED)

## 2020-02-06 NOTE — EMERGENCY DEPARTMENT REPORT
ED Female  HPI





- General


Chief complaint: Abdominal Pain


Stated complaint: ABD PAIN; suprapubic rash; vaginal discharge


Source: patient


Mode of arrival: Ambulatory


Limitations: No Limitations





- History of Present Illness


Initial comments: 





Patient is a A0 24-year-old -American female with no past medical 

history who presents to the ED record in no acute onset persistent itchy painful

suprapubic erythematous macular papular rashes diffusely after shaving her pubic

hair and also complains of low abdominal pain with vaginal discharge for one 

week.  Patient denies fever, chills, dysuria, urinary frequency and urgency, low

back pain, dizziness, fever, chills, nausea, vomiting, diarrhea, cough, chest 

pain or shortness of breath or vaginal bleeding.


MD Complaint: vaginal discharge, pelvic pain, other (suprapubic rash)


-: Sudden, days(s) (3)


Location: labia, suprapubic


Radiation: non-radiating


Severity: moderate


Severity scale (0 -10): 4


Quality: dull, aching


Consistency: constant


Improves with: none


Worsens with: none


Are you Pregnant Now?: No


Last Menstrual Period: 20


EDC: 20


Associated Symptoms: denies other symptoms, vaginal discharge, abdominal pain.  

denies: vaginal bleeding, nausea/vomiting, fever/chills, headaches, loss of 

appetite, dysuria, hematuria, rash, seizure, shortness of breath, syncope, 

weakness, other





- Related Data


Sexually active: Yes


: 2


Para: 2


A: 0


                                  Previous Rx's











 Medication  Instructions  Recorded  Last Taken  Type


 


Cyclobenzaprine [Flexeril] 10 mg PO TID PRN #12 tablet 17 Unknown Rx


 


Ibuprofen [Motrin] 600 mg PO Q8H PRN #25 tablet 17 Unknown Rx


 


Cephalexin [Keflex] 500 mg PO TID #30 capsule 17 Unknown Rx


 


Sulfamethoxazole/Trimethoprim 1 each PO BID #20 tablet 17 Unknown Rx





[Bactrim DS TAB]    


 


traMADoL [Ultram] 50 mg PO Q4HR PRN #20 tablet 17 Unknown Rx


 


Nitrofurantoin Monohyd/M-Cryst 100 mg PO Q12H #20 capsule 10/10/18 Unknown Rx





[Macrobid 100 mg Capsule]    


 


Cetirizine HCl/Pseudoephedrine 1 each PO QDAY #30 tab.er.12h 18 Unknown Rx





[Zyrtec-D Tablet]    


 


Fluticasone [Flonase] 1 spray NS QDAY #1 bottle 18 Unknown Rx


 


traMADoL [Ultram] 50 mg PO Q6HR PRN #20 tablet 18 Unknown Rx


 


Acetaminophen 500 mg PO Q8H PRN #20 tablet 19 Unknown Rx


 


ALBUTEROL Inhaler(NF) [VENTOLIN 2 puff IH Q4H PRN #1 inha 19 Unknown Rx





Inhaler(NF)]    


 


Acetaminophen [Tylenol] 650 mg PO QID PRN #30 capsule 19 Unknown Rx


 


Azithromycin [Zithromax Z-MENDOZA] 250 mg PO DAILY #6 tab 19 Unknown Rx


 


Ondansetron [Zofran Odt] 4 mg PO Q8HR PRN #12 tab.rapdis 19 Unknown Rx


 


guaiFENesin 400 mg PO Q4H PRN #24 tablet 19 Unknown Rx


 


predniSONE [Deltasone] 40 mg PO QDAY 5 Days #10 tab 19 Unknown Rx


 


DOXYCYCLINE Hyclate [Vibramycin 100 mg PO Q12HR #20 capsule 20 Unknown Rx





CAP]    


 


Ibuprofen [Motrin] 600 mg PO Q8H PRN #20 tablet 20 Unknown Rx


 


Triamcinolone Acetonide 1 applicatio TP Q12H #1 tube 20 Unknown Rx





[Triamcinolone Acetonide Oint 0.5%]    


 


metroNIDAZOLE [Flagyl] 500 mg PO Q12HR #14 tab 20 Unknown Rx











                                    Allergies











Allergy/AdvReac Type Severity Reaction Status Date / Time


 


No Known Allergies Allergy   Unverified 19 10:03














ED Review of Systems


ROS: 


Stated complaint: ABD PAIN


Other details as noted in HPI





Constitutional: denies: chills, fever


Eyes: denies: eye pain, eye discharge, vision change


ENT: denies: ear pain, throat pain


Respiratory: denies: cough, shortness of breath, wheezing


Cardiovascular: denies: chest pain, palpitations


Endocrine: no symptoms reported


Gastrointestinal: abdominal pain (suprapubic), other (erythematous maculopapular

 rash on pubic area).  denies: nausea, diarrhea


Genitourinary: discharge.  denies: urgency, dysuria


Musculoskeletal: denies: back pain, joint swelling, arthralgia


Skin: rash (erythematous maculopapular rash on pupic area), pruritus.  denies: 

lesions


Neurological: denies: headache, weakness, paresthesias


Psychiatric: denies: anxiety, depression


Hematological/Lymphatic: denies: easy bleeding, easy bruising





ED Past Medical Hx





- Past Medical History


Hx Asthma: Yes


Additional medical history: vaginal delivery x 1





- Surgical History


Additional Surgical History: 





- Social History


Smoking Status: Never Smoker


Substance Use Type: None





- Medications


Home Medications: 


                                Home Medications











 Medication  Instructions  Recorded  Confirmed  Last Taken  Type


 


Cyclobenzaprine [Flexeril] 10 mg PO TID PRN #12 tablet 17  Unknown Rx


 


Ibuprofen [Motrin] 600 mg PO Q8H PRN #25 tablet 17  Unknown Rx


 


Cephalexin [Keflex] 500 mg PO TID #30 capsule 17  Unknown Rx


 


Sulfamethoxazole/Trimethoprim 1 each PO BID #20 tablet 17  Unknown Rx





[Bactrim DS TAB]     


 


traMADoL [Ultram] 50 mg PO Q4HR PRN #20 tablet 17  Unknown Rx


 


Nitrofurantoin Monohyd/M-Cryst 100 mg PO Q12H #20 capsule 10/10/18  Unknown Rx





[Macrobid 100 mg Capsule]     


 


Cetirizine HCl/Pseudoephedrine 1 each PO QDAY #30 tab.er.12h 18  Unknown 

Rx





[Zyrtec-D Tablet]     


 


Fluticasone [Flonase] 1 spray NS QDAY #1 bottle 18  Unknown Rx


 


traMADoL [Ultram] 50 mg PO Q6HR PRN #20 tablet 18  Unknown Rx


 


Acetaminophen 500 mg PO Q8H PRN #20 tablet 19  Unknown Rx


 


ALBUTEROL Inhaler(NF) [VENTOLIN 2 puff IH Q4H PRN #1 inha 19  Unknown Rx





Inhaler(NF)]     


 


Acetaminophen [Tylenol] 650 mg PO QID PRN #30 capsule 19  Unknown Rx


 


Azithromycin [Zithromax Z-MENDOZA] 250 mg PO DAILY #6 tab 19  Unknown Rx


 


Ondansetron [Zofran Odt] 4 mg PO Q8HR PRN #12 tab.rapdis 19  Unknown Rx


 


guaiFENesin 400 mg PO Q4H PRN #24 tablet 19  Unknown Rx


 


predniSONE [Deltasone] 40 mg PO QDAY 5 Days #10 tab 19  Unknown Rx


 


DOXYCYCLINE Hyclate [Vibramycin 100 mg PO Q12HR #20 capsule 20  Unknown Rx





CAP]     


 


Ibuprofen [Motrin] 600 mg PO Q8H PRN #20 tablet 20  Unknown Rx


 


Triamcinolone Acetonide 1 applicatio TP Q12H #1 tube 20  Unknown Rx





[Triamcinolone Acetonide Oint 0.5%]     


 


metroNIDAZOLE [Flagyl] 500 mg PO Q12HR #14 tab 20  Unknown Rx














ED Physical Exam





- General


Limitations: No Limitations


General appearance: alert, in no apparent distress





- Head


Head exam: Present: atraumatic, normocephalic, normal inspection





- Eye


Eye exam: Present: normal appearance, PERRL, EOMI


Pupils: Present: normal accommodation





- ENT


ENT exam: Present: normal exam, normal orophraynx, mucous membranes moist, TM's 

normal bilaterally, normal external ear exam





- Neck


Neck exam: Present: normal inspection, full ROM.  Absent: tenderness





- Respiratory


Respiratory exam: Present: normal lung sounds bilaterally.  Absent: respiratory 

distress, wheezes, rales, rhonchi, chest wall tenderness, accessory muscle use, 

decreased breath sounds, prolonged expiratory





- Cardiovascular


Cardiovascular Exam: Present: regular rate, normal rhythm, normal heart sounds. 

 Absent: systolic murmur, diastolic murmur, rubs, gallop





- GI/Abdominal


GI/Abdominal exam: Present: soft, normal bowel sounds.  Absent: tenderness, 

hyperactive bowel sounds, hypoactive bowel sounds, organomegaly





- 


External exam: Present: erythema, other (mildly erythematous suprapubic 

maculopapular rashes)


Speculum exam: Present: vaginal discharge


Bi-manual exam: Present: normal bi-manual exam





- Extremities Exam


Extremities exam: Present: normal inspection, full ROM, normal capillary refill





- Back Exam


Back exam: Present: normal inspection, full ROM.  Absent: tenderness, CVA 

tenderness (L), muscle spasm





- Neurological Exam


Neurological exam: Present: alert, oriented X3, CN II-XII intact, normal gait, 

reflexes normal





- Psychiatric


Psychiatric exam: Present: normal affect, normal mood





- Skin


Skin exam: Present: warm, dry, intact, normal color, rash (mildly erythematous 

maculopapular rashes on pubic area)





ED Course





                                   Vital Signs











  20





  14:24


 


Temperature 98.7 F


 


Pulse Rate 105 H


 


Respiratory 18





Rate 


 


Blood Pressure 126/82


 


O2 Sat by Pulse 96





Oximetry 














ED Medical Decision Making





- Medical Decision Making





This is a A0 24-year-old female who presented to the ED with suprapubic 

erythematous maculopapular rash, vaginal discharge and discomfort for the last 1

 week, was in the last 2 days after shaving.  In the ED, patient is alert and 

oriented 3 and is not in distress.  Pelvic exam is unremarkable except for mild

 yellow vaginal discharge.  Urinalysis is unremarkable and wet prep shows no 

evidence of Trichomonas or yeast but mild that Gardnerella vaginalis.  Patient 

was discharged home on medication and advised follow-up with OB/GYN physician or

 primary care physician in 5-7 days for reevaluation.  Patient was advised to 

return to the ED immediately if symptoms get worse.





- Differential Diagnosis


Folliculitis; Bacterial vaginosis, vaginitis; Trichomonas, UTI


Critical care attestation.: 


If time is entered above; I have spent that time in minutes in the direct care 

of this critically ill patient, excluding procedure time.








ED Disposition


Clinical Impression: 


 Acute folliculitis, Bacterial vaginosis





Abdominal pain


Qualifiers:


 Abdominal location: lower abdomen, unspecified Qualified Code(s): R10.30 - 

Lower abdominal pain, unspecified





Disposition: - TO HOME OR SELFCARE


Is pt being admited?: No


Does the pt Need Aspirin: No


Condition: Stable


Instructions:  Abdominal Pain (ED), Bacterial Vaginosis (ED), Folliculitis (ED)


Additional Instructions: 


Take medication with food, drink plenty of fluids and follow-up with her primary

 care physician in 7-10 days for reevaluation.  Return to the ED immediately if 

symptoms get worse.


Prescriptions: 


metroNIDAZOLE [Flagyl] 500 mg PO Q12HR #14 tab


Ibuprofen [Motrin] 600 mg PO Q8H PRN #20 tablet


 PRN Reason: Pain


Triamcinolone Acetonide [Triamcinolone Acetonide Oint 0.5%] 1 applicatio TP Q12H

#1 tube


DOXYCYCLINE Hyclate [Vibramycin CAP] 100 mg PO Q12HR #20 capsule


Referrals: 


PRIMARY CARE,MD [Primary Care Provider] - 3-5 Days


Forms:  STI Treatment and Prevention


Time of Disposition: 23:17


Print Language: ENGLISH

## 2020-02-23 ENCOUNTER — HOSPITAL ENCOUNTER (EMERGENCY)
Dept: HOSPITAL 5 - ED | Age: 25
Discharge: LEFT BEFORE DISCHARGE | End: 2020-02-23
Payer: MEDICAID

## 2020-02-23 VITALS — SYSTOLIC BLOOD PRESSURE: 109 MMHG | DIASTOLIC BLOOD PRESSURE: 75 MMHG

## 2020-02-23 DIAGNOSIS — N89.8: Primary | ICD-10-CM

## 2020-02-23 PROCEDURE — 99281 EMR DPT VST MAYX REQ PHY/QHP: CPT

## 2020-02-23 NOTE — EMERGENCY DEPARTMENT REPORT
Chief Complaint: Urogenital-Female


Stated Complaint: URINARY ISSUES


Time Seen by Provider: 02/23/20 18:40





- HPI


History of Present Illness: 





pt is a 23 yo female who presents for results of her swabs from 2/6/2020


she was seen at that time for the exact same complaints, UA was normal, G/C was 

negative, wet prep was negative, pt was given prescription for doxcycline and 

flagyl 


states she has had continued vaginal itching and discharge 


no diarrhea


no fever


no abd pain 


did not follow up with anyone 


PMHx none 


no allergies to meds








Patient has already been worked up from this emergency department for this 

complaint with negative results, discussed with patient that she would need to 

see an OB/GYN due to continuing having issues to have further evaluation and 

examination





advised pt to please follow up with the health department, OB/GYN or primary 

care clinic. please have a full STD panel performed. have any partner tested and

treated as well. no sexual intercourse until you have been seen. wash with a non

scented soap.  return to the emergency room for any new or worsening symptoms.





Patient is presenting with a nonmedical emergency at this time


Nontoxic-appearing, vitals are normal, afebrile


Patient referred to the appropriate resources





MSE screening note: 


Focused history and physical exam performed.














ED Disposition for MSE


Clinical Impression: 


 Vaginal discharge





Disposition: Z-07 MED SCREENING EXAM-LEFT


Is pt being admited?: No


Does the pt Need Aspirin: No


Condition: Stable


Instructions:  Vaginitis (ED)


Additional Instructions: 


please follow up with the health department, OB/GYN or primary care clinic. 

please have a full STD panel performed. have any partner tested and treated as 

well. no sexual intercourse until you have been seen. wash with a non scented 

soap.  return to the emergency room for any new or worsening symptoms.


Referrals: 


[facility] [Outside] - 2-3 Days


[facility] [Provider Group] - 2-3 Days


[facility] [Provider Group] - 2-3 Days


[facility] [Provider Group] - 2-3 Days


[facility] [Outside] - 2-3 Days


[facility] [Outside] - 2-3 Days


Time of Disposition: 18:43


Print Language: ENGLISH

## 2020-04-25 ENCOUNTER — HOSPITAL ENCOUNTER (EMERGENCY)
Dept: HOSPITAL 5 - ED | Age: 25
Discharge: HOME | End: 2020-04-25
Payer: MEDICAID

## 2020-04-25 VITALS — SYSTOLIC BLOOD PRESSURE: 106 MMHG | DIASTOLIC BLOOD PRESSURE: 64 MMHG

## 2020-04-25 DIAGNOSIS — J45.909: ICD-10-CM

## 2020-04-25 DIAGNOSIS — R30.0: Primary | ICD-10-CM

## 2020-04-25 DIAGNOSIS — R30.9: ICD-10-CM

## 2020-04-25 DIAGNOSIS — Z79.899: ICD-10-CM

## 2020-04-25 DIAGNOSIS — Z98.890: ICD-10-CM

## 2020-04-25 LAB
BILIRUB UR QL STRIP: (no result)
BLOOD UR QL VISUAL: (no result)
MUCOUS THREADS #/AREA URNS HPF: (no result) /HPF
PH UR STRIP: 5 [PH] (ref 5–7)
RBC #/AREA URNS HPF: 2 /HPF (ref 0–6)
UROBILINOGEN UR-MCNC: 4 MG/DL (ref ?–2)
WBC #/AREA URNS HPF: 5 /HPF (ref 0–6)

## 2020-04-25 PROCEDURE — 99283 EMERGENCY DEPT VISIT LOW MDM: CPT

## 2020-04-25 PROCEDURE — 81025 URINE PREGNANCY TEST: CPT

## 2020-04-25 PROCEDURE — 81001 URINALYSIS AUTO W/SCOPE: CPT

## 2020-04-25 NOTE — EMERGENCY DEPARTMENT REPORT
ED Female  HPI





- General


Chief complaint: Urogenital-Female


Stated complaint: UROGENITAL COMPLAINTS


Time Seen by Provider: 20 15:35


Source: patient


Mode of arrival: Ambulatory


Limitations: No Limitations





- History of Present Illness


Initial comments: 





24-year-old -American female presents to the emergency room reporting 

that she has been having some burning with urination for a few days.  Patient 

denies any vaginal discharge or pelvic pain.  Patient also reports that her 

sexual partner was seen here earlier and was treated for STD.


MD Complaint: dysuria


Onset/Timing: 3


-: days(s)


Consistency: intermittent


Worsens with: urination


Are you Pregnant Now?: No





- Related Data


                                  Previous Rx's











 Medication  Instructions  Recorded  Last Taken  Type


 


Cyclobenzaprine [Flexeril] 10 mg PO TID PRN #12 tablet 17 Unknown Rx


 


Ibuprofen [Motrin] 600 mg PO Q8H PRN #25 tablet 17 Unknown Rx


 


Cephalexin [Keflex] 500 mg PO TID #30 capsule 17 Unknown Rx


 


Sulfamethoxazole/Trimethoprim 1 each PO BID #20 tablet 17 Unknown Rx





[Bactrim DS TAB]    


 


traMADoL [Ultram] 50 mg PO Q4HR PRN #20 tablet 17 Unknown Rx


 


Nitrofurantoin Monohyd/M-Cryst 100 mg PO Q12H #20 capsule 10/10/18 Unknown Rx





[Macrobid 100 mg Capsule]    


 


Cetirizine HCl/Pseudoephedrine 1 each PO QDAY #30 tab.er.12h 18 Unknown Rx





[Zyrtec-D Tablet]    


 


Fluticasone [Flonase] 1 spray NS QDAY #1 bottle 18 Unknown Rx


 


traMADoL [Ultram] 50 mg PO Q6HR PRN #20 tablet 18 Unknown Rx


 


Acetaminophen 500 mg PO Q8H PRN #20 tablet 19 Unknown Rx


 


ALBUTEROL Inhaler(NF) [VENTOLIN 2 puff IH Q4H PRN #1 inha 19 Unknown Rx





Inhaler(NF)]    


 


Acetaminophen [Tylenol] 650 mg PO QID PRN #30 capsule 19 Unknown Rx


 


Azithromycin [Zithromax Z-MENDOZA] 250 mg PO DAILY #6 tab 19 Unknown Rx


 


Ondansetron [Zofran Odt] 4 mg PO Q8HR PRN #12 tab.rapdis 19 Unknown Rx


 


guaiFENesin 400 mg PO Q4H PRN #24 tablet 19 Unknown Rx


 


predniSONE [Deltasone] 40 mg PO QDAY 5 Days #10 tab 19 Unknown Rx


 


DOXYCYCLINE Hyclate [Vibramycin 100 mg PO Q12HR #20 capsule 20 Unknown Rx





CAP]    


 


Ibuprofen [Motrin] 600 mg PO Q8H PRN #20 tablet 20 Unknown Rx


 


Triamcinolone Acetonide 1 applicatio TP Q12H #1 tube 20 Unknown Rx





[Triamcinolone Acetonide Oint 0.5%]    


 


metroNIDAZOLE [Flagyl] 500 mg PO Q12HR #14 tab 20 Unknown Rx











                                    Allergies











Allergy/AdvReac Type Severity Reaction Status Date / Time


 


No Known Allergies Allergy   Unverified 19 10:03














ED Review of Systems


ROS: 


Stated complaint: UROGENITAL COMPLAINTS


Other details as noted in HPI





Comment: All other systems reviewed and negative





ED Past Medical Hx





- Past Medical History


Previous Medical History?: Yes


Hx Asthma: Yes


Additional medical history: vaginal delivery x 1





- Surgical History


Past Surgical History?: Yes


Additional Surgical History: 





- Social History


Smoking Status: Never Smoker


Substance Use Type: None





- Medications


Home Medications: 


                                Home Medications











 Medication  Instructions  Recorded  Confirmed  Last Taken  Type


 


Cyclobenzaprine [Flexeril] 10 mg PO TID PRN #12 tablet 17  Unknown Rx


 


Ibuprofen [Motrin] 600 mg PO Q8H PRN #25 tablet 17  Unknown Rx


 


Cephalexin [Keflex] 500 mg PO TID #30 capsule 17  Unknown Rx


 


Sulfamethoxazole/Trimethoprim 1 each PO BID #20 tablet 17  Unknown Rx





[Bactrim DS TAB]     


 


traMADoL [Ultram] 50 mg PO Q4HR PRN #20 tablet 17  Unknown Rx


 


Nitrofurantoin Monohyd/M-Cryst 100 mg PO Q12H #20 capsule 10/10/18  Unknown Rx





[Macrobid 100 mg Capsule]     


 


Cetirizine HCl/Pseudoephedrine 1 each PO QDAY #30 tab.er.12h 18  Unknown 

Rx





[Zyrtec-D Tablet]     


 


Fluticasone [Flonase] 1 spray NS QDAY #1 bottle 18  Unknown Rx


 


traMADoL [Ultram] 50 mg PO Q6HR PRN #20 tablet 18  Unknown Rx


 


Acetaminophen 500 mg PO Q8H PRN #20 tablet 19  Unknown Rx


 


ALBUTEROL Inhaler(NF) [VENTOLIN 2 puff IH Q4H PRN #1 inha 19  Unknown Rx





Inhaler(NF)]     


 


Acetaminophen [Tylenol] 650 mg PO QID PRN #30 capsule 19  Unknown Rx


 


Azithromycin [Zithromax Z-MENDOZA] 250 mg PO DAILY #6 tab 19  Unknown Rx


 


Ondansetron [Zofran Odt] 4 mg PO Q8HR PRN #12 tab.rapdis 19  Unknown Rx


 


guaiFENesin 400 mg PO Q4H PRN #24 tablet 19  Unknown Rx


 


predniSONE [Deltasone] 40 mg PO QDAY 5 Days #10 tab 19  Unknown Rx


 


DOXYCYCLINE Hyclate [Vibramycin 100 mg PO Q12HR #20 capsule 20  Unknown Rx





CAP]     


 


Ibuprofen [Motrin] 600 mg PO Q8H PRN #20 tablet 20  Unknown Rx


 


Triamcinolone Acetonide 1 applicatio TP Q12H #1 tube 20  Unknown Rx





[Triamcinolone Acetonide Oint 0.5%]     


 


metroNIDAZOLE [Flagyl] 500 mg PO Q12HR #14 tab 20  Unknown Rx














ED Physical Exam





- General


Limitations: No Limitations


General appearance: alert, in no apparent distress





- Head


Head exam: Present: atraumatic, normocephalic





- ENT


ENT exam: Present: mucous membranes moist





- Neurological Exam


Neurological exam: Present: alert, oriented X3





- Psychiatric


Psychiatric exam: Present: normal affect, normal mood





- Skin


Skin exam: Present: warm, dry, intact, normal color.  Absent: rash





ED Course





                                   Vital Signs











  20





  14:29


 


Temperature 99.1 F


 


Pulse Rate 89


 


Respiratory 18





Rate 


 


Blood Pressure 106/64


 


O2 Sat by Pulse 98





Oximetry 














ED Medical Decision Making





- Lab Data








                             Laboratory Last Values











Urine Color  Haritha  (Yellow)   20  14:50    


 


Urine Turbidity  Clear  (Clear)   20  14:50    


 


Urine pH  5.0  (5.0-7.0)   20  14:50    


 


Ur Specific Gravity  1.029  (1.003-1.030)   20  14:50    


 


Urine Protein  30 mg/dl mg/dL (Negative)   20  14:50    


 


Urine Glucose (UA)  Neg mg/dL (Negative)   20  14:50    


 


Urine Ketones  Tr mg/dL (Negative)   20  14:50    


 


Urine Blood  Neg  (Negative)   20  14:50    


 


Urine Nitrite  Neg  (Negative)   20  14:50    


 


Urine Bilirubin  Neg  (Negative)   20  14:50    


 


Urine Urobilinogen  4.0 mg/dL (<2.0)   20  14:50    


 


Ur Leukocyte Esterase  Neg  (Negative)   20  14:50    


 


Urine WBC (Auto)  5.0 /HPF (0.0-6.0)   20  14:50    


 


Urine RBC (Auto)  2.0 /HPF (0.0-6.0)   20  14:50    


 


U Epithel Cells (Auto)  9.0 /HPF (0-13.0)   20  14:50    


 


Urine Mucus  3+ /HPF  20  14:50    


 


Urine HCG, Qual  Negative  (Negative)   20  14:50    














- Medical Decision Making





24-year-old -American female presents to the emergency room reporting 

that she has been having some burning with urination for a few days.  Patient 

denies any vaginal discharge or pelvic pain.  Patient also reports that her 

sexual partner was seen here earlier and was treated for STD.











Urinalysis is negative for any acute infection.  Recommend to follow-up at a 

OB/GYN or health department for a full STD work-up.


Critical care attestation.: 


If time is entered above; I have spent that time in minutes in the direct care 

of this critically ill patient, excluding procedure time.








ED Disposition


Clinical Impression: 


 Dysuria





Disposition: DC-01 TO HOME OR SELFCARE


Is pt being admited?: No


Does the pt Need Aspirin: No


Condition: Stable


Instructions:  Dysuria (ED)


Additional Instructions: 


Urinalysis is negative for any acute infection.  Recommend to follow-up at a 

OB/GYN or health department for a full STD work-up.


Referrals: 


[facility] [Provider Group] - 3-5 Days


[facility] [Provider Group] - 3-5 Days


[facility] [Provider Group] - 3-5 Days


[facility] [Outside] - 3-5 Days


[facility] [Outside] - 3-5 Days

## 2020-08-01 ENCOUNTER — HOSPITAL ENCOUNTER (EMERGENCY)
Dept: HOSPITAL 5 - ED | Age: 25
Discharge: LEFT BEFORE DISCHARGE | End: 2020-08-01
Payer: MEDICAID

## 2020-08-01 VITALS — SYSTOLIC BLOOD PRESSURE: 111 MMHG | DIASTOLIC BLOOD PRESSURE: 51 MMHG

## 2020-08-01 DIAGNOSIS — Z53.21: ICD-10-CM

## 2020-08-01 DIAGNOSIS — L29.2: Primary | ICD-10-CM

## 2020-08-01 LAB
BILIRUB UR QL STRIP: (no result)
BLOOD UR QL VISUAL: (no result)
MUCOUS THREADS #/AREA URNS HPF: (no result) /HPF
PH UR STRIP: 5 [PH] (ref 5–7)
RBC #/AREA URNS HPF: 4 /HPF (ref 0–6)
UROBILINOGEN UR-MCNC: 4 MG/DL (ref ?–2)
WBC #/AREA URNS HPF: 20 /HPF (ref 0–6)

## 2020-08-01 PROCEDURE — 87086 URINE CULTURE/COLONY COUNT: CPT

## 2020-08-01 PROCEDURE — 81025 URINE PREGNANCY TEST: CPT

## 2020-08-01 PROCEDURE — 81001 URINALYSIS AUTO W/SCOPE: CPT

## 2020-11-06 ENCOUNTER — HOSPITAL ENCOUNTER (EMERGENCY)
Dept: HOSPITAL 5 - ED | Age: 25
Discharge: HOME | End: 2020-11-06
Payer: MEDICAID

## 2020-11-06 VITALS — SYSTOLIC BLOOD PRESSURE: 97 MMHG | DIASTOLIC BLOOD PRESSURE: 57 MMHG

## 2020-11-06 DIAGNOSIS — G44.209: ICD-10-CM

## 2020-11-06 DIAGNOSIS — Z79.899: ICD-10-CM

## 2020-11-06 DIAGNOSIS — Z98.890: ICD-10-CM

## 2020-11-06 DIAGNOSIS — J45.901: Primary | ICD-10-CM

## 2020-11-06 DIAGNOSIS — Z79.1: ICD-10-CM

## 2020-11-06 DIAGNOSIS — Z79.2: ICD-10-CM

## 2020-11-06 PROCEDURE — 99282 EMERGENCY DEPT VISIT SF MDM: CPT

## 2020-11-06 NOTE — EMERGENCY DEPARTMENT REPORT
ED General Adult HPI





- General


Chief complaint: Adult Asthma


Stated complaint: ASTHMA


Time Seen by Provider: 20 13:42


Source: patient


Mode of arrival: Ambulatory


Limitations: No Limitations





- History of Present Illness


Initial comments: 





25-year-old -American female patient presents with complaints of 

intermittent headache since yesterday and wheezing this morning.  She reports 

history of asthma and states this feels like her normal asthma exacerbation.  

She states she recently ran out of her albuterol inhaler.  She denies any cough,

shortness of breath, chest pain, fever/chills/sweats, abdominal pain, or 

nausea/vomiting/diarrhea.  Patient rates her current headache as 8/10 in 

severity and states it feels like a throbbing tight band around her head.  She 

denies thunderclap onset or worst headache of her life.  Patient reports she has

not tried any over-the-counter medications for her headache.  She also denies 

any head injury, vision changes, numbness/tingling/weakness in her limbs, 

difficulty with speech/ambulation, or history of CVA.


Severity scale (0 -10): 0





- Related Data


                                  Previous Rx's











 Medication  Instructions  Recorded  Last Taken  Type


 


Cyclobenzaprine [Flexeril] 10 mg PO TID PRN #12 tablet 17 Unknown Rx


 


Ibuprofen [Motrin] 600 mg PO Q8H PRN #25 tablet 17 Unknown Rx


 


Cephalexin [Keflex] 500 mg PO TID #30 capsule 17 Unknown Rx


 


Sulfamethoxazole/Trimethoprim 1 each PO BID #20 tablet 17 Unknown Rx





[Bactrim DS TAB]    


 


traMADoL [Ultram] 50 mg PO Q4HR PRN #20 tablet 17 Unknown Rx


 


Nitrofurantoin Monohyd/M-Cryst 100 mg PO Q12H #20 capsule 10/10/18 Unknown Rx





[Macrobid 100 mg Capsule]    


 


Cetirizine HCl/Pseudoephedrine 1 each PO QDAY #30 tab.er.12h 18 Unknown Rx





[Zyrtec-D Tablet]    


 


Fluticasone [Flonase] 1 spray NS QDAY #1 bottle 18 Unknown Rx


 


traMADoL [Ultram] 50 mg PO Q6HR PRN #20 tablet 18 Unknown Rx


 


Acetaminophen 500 mg PO Q8H PRN #20 tablet 19 Unknown Rx


 


ALBUTEROL Inhaler(NF) [VENTOLIN 2 puff IH Q4H PRN #1 inha 19 Unknown Rx





Inhaler(NF)]    


 


Acetaminophen [Tylenol] 650 mg PO QID PRN #30 capsule 19 Unknown Rx


 


Azithromycin [Zithromax Z-MENDOZA] 250 mg PO DAILY #6 tab 19 Unknown Rx


 


Ondansetron [Zofran Odt] 4 mg PO Q8HR PRN #12 tab.rapdis 19 Unknown Rx


 


guaiFENesin 400 mg PO Q4H PRN #24 tablet 19 Unknown Rx


 


predniSONE [Deltasone] 40 mg PO QDAY 5 Days #10 tab 19 Unknown Rx


 


DOXYCYCLINE Hyclate [Vibramycin 100 mg PO Q12HR #20 capsule 20 Unknown Rx





CAP]    


 


Ibuprofen [Motrin] 600 mg PO Q8H PRN #20 tablet 20 Unknown Rx


 


Triamcinolone Acetonide 1 applicatio TP Q12H #1 tube 20 Unknown Rx





[Triamcinolone Acetonide Oint 0.5%]    


 


metroNIDAZOLE [Flagyl] 500 mg PO Q12HR #14 tab 20 Unknown Rx


 


Albuterol Sulfate [Proventil Hfa] 6.7 gm IH QID PRN #1 hfa.aer.ad 20 

Unknown Rx


 


methylPREDNISolone [Medrol 4MG 4 mg PO UNK #1 tab.ds.pk 20 Unknown Rx





DOSEPAK (21 tabs)]    











                                    Allergies











Allergy/AdvReac Type Severity Reaction Status Date / Time


 


No Known Allergies Allergy   Unverified 19 10:03














ED Review of Systems


ROS: 


Stated complaint: ASTHMA


Other details as noted in HPI





Constitutional: denies: chills, diaphoresis, fever, malaise


Eyes: vision change


Respiratory: wheezing.  denies: cough, shortness of breath


Cardiovascular: denies: chest pain


Endocrine: denies: excessive sweating


Gastrointestinal: denies: abdominal pain, nausea, vomiting, diarrhea


Skin: denies: change in color


Neurological: headache.  denies: numbness, paresthesias, confusion, abnormal 

gait





ED Past Medical Hx





- Past Medical History


Previous Medical History?: Yes


Hx Asthma: Yes


Additional medical history: vaginal delivery x 1





- Surgical History


Past Surgical History?: Yes


Additional Surgical History: 





- Social History


Smoking Status: Never Smoker


Substance Use Type: None





- Medications


Home Medications: 


                                Home Medications











 Medication  Instructions  Recorded  Confirmed  Last Taken  Type


 


Cyclobenzaprine [Flexeril] 10 mg PO TID PRN #12 tablet 17  Unknown Rx


 


Ibuprofen [Motrin] 600 mg PO Q8H PRN #25 tablet 17  Unknown Rx


 


Cephalexin [Keflex] 500 mg PO TID #30 capsule 17  Unknown Rx


 


Sulfamethoxazole/Trimethoprim 1 each PO BID #20 tablet 17  Unknown Rx





[Bactrim DS TAB]     


 


traMADoL [Ultram] 50 mg PO Q4HR PRN #20 tablet 17  Unknown Rx


 


Nitrofurantoin Monohyd/M-Cryst 100 mg PO Q12H #20 capsule 10/10/18  Unknown Rx





[Macrobid 100 mg Capsule]     


 


Cetirizine HCl/Pseudoephedrine 1 each PO QDAY #30 tab.er.12h 18  Unknown 

Rx





[Zyrtec-D Tablet]     


 


Fluticasone [Flonase] 1 spray NS QDAY #1 bottle 18  Unknown Rx


 


traMADoL [Ultram] 50 mg PO Q6HR PRN #20 tablet 18  Unknown Rx


 


Acetaminophen 500 mg PO Q8H PRN #20 tablet 19  Unknown Rx


 


ALBUTEROL Inhaler(NF) [VENTOLIN 2 puff IH Q4H PRN #1 inha 19  Unknown Rx





Inhaler(NF)]     


 


Acetaminophen [Tylenol] 650 mg PO QID PRN #30 capsule 19  Unknown Rx


 


Azithromycin [Zithromax Z-MENDOZA] 250 mg PO DAILY #6 tab 19  Unknown Rx


 


Ondansetron [Zofran Odt] 4 mg PO Q8HR PRN #12 tab.rapdis 19  Unknown Rx


 


guaiFENesin 400 mg PO Q4H PRN #24 tablet 19  Unknown Rx


 


predniSONE [Deltasone] 40 mg PO QDAY 5 Days #10 tab 19  Unknown Rx


 


DOXYCYCLINE Hyclate [Vibramycin 100 mg PO Q12HR #20 capsule 20  Unknown Rx





CAP]     


 


Ibuprofen [Motrin] 600 mg PO Q8H PRN #20 tablet 20  Unknown Rx


 


Triamcinolone Acetonide 1 applicatio TP Q12H #1 tube 20  Unknown Rx





[Triamcinolone Acetonide Oint 0.5%]     


 


metroNIDAZOLE [Flagyl] 500 mg PO Q12HR #14 tab 20  Unknown Rx


 


Albuterol Sulfate [Proventil Hfa] 6.7 gm IH QID PRN #1 hfa.aer.ad 20  

Unknown Rx


 


methylPREDNISolone [Medrol 4MG 4 mg PO UNK #1 tab.ds.pk 20  Unknown Rx





DOSEPAK (21 tabs)]     














ED Physical Exam





- General


Limitations: No Limitations


General appearance: alert, in no apparent distress





- Head


Head exam: Present: atraumatic, normocephalic





- Eye


Eye exam: Present: normal appearance.  Absent: scleral icterus





- ENT


ENT exam: Present: mucous membranes moist





- Neck


Neck exam: Present: normal inspection





- Respiratory


Respiratory exam: Present: normal lung sounds bilaterally, wheezes (minimal ).  

Absent: respiratory distress, rales, rhonchi, stridor, accessory muscle use





- Cardiovascular


Cardiovascular Exam: Present: regular rate, normal rhythm.  Absent: systolic 

murmur, diastolic murmur, rubs, gallop





- GI/Abdominal


GI/Abdominal exam: Present: soft, normal bowel sounds





- Back Exam


Back exam: Present: full ROM





- Neurological Exam


Neurological exam: Present: alert, oriented X3, CN II-XII intact, normal gait.  

Absent: motor sensory deficit





- Expanded Neurological Exam


  ** Expanded


Cerebellar function: Finger to Nose: Normal, Heel to Shin: Normal, Romberg: 

Normal


Motor strength exam: RUE: 5, LUE: 5, RLE: 5, LLE: 5





- Psychiatric


Psychiatric exam: Present: normal affect, normal mood





- Skin


Skin exam: Present: warm, dry, intact, normal color.  Absent: rash, cyanosis, 

diaphoretic, ecchymosis





ED Course





                                   Vital Signs











  20





  13:38


 


Temperature 98.2 F


 


Pulse Rate 85


 


Respiratory 16





Rate 


 


Blood Pressure 97/57





[Right] 


 


O2 Sat by Pulse 98





Oximetry 














ED Medical Decision Making





- Medical Decision Making








25-year-old -American female patient presents with complaints of 

intermittent headache since yesterday and wheezing this morning.  She reports 

history of asthma and states this feels like her normal asthma exacerbation.  

She states she recently ran out of her albuterol inhaler.  She denies any cough,

 shortness of breath, chest pain, fever/chills/sweats, abdominal pain, or 

nausea/vomiting/diarrhea.  Patient rates her current headache as 8/10 in 

severity and states it feels like a throbbing tight band around her head.  She 

denies thunderclap onset or worst headache of her life.  Patient reports she has

 not tried any over-the-counter medications for her headache.  She also denies 

any head injury, vision changes, numbness/tingling/weakness in her limbs, 

difficulty with speech/ambulation, or history of CVA.


Minimal wheezing noted on exam.  Vitals are normal.  Neuro exam is normal.  

Patient given Fioricet, prednisone, and ibuprofen.  Patient states she does not 

wish to wait for reevaluation after medications and elects to discharge home.  

Reports if her symptoms do not improve with the medication so she will report 

back to the ED.  Discussed signs and symptoms that should prompt immediate 

return to the emergency department in detail with patient who verbalized 

understanding.  She is well-appearing and stable for discharge home.  Patient to

 follow-up with her primary care doctor next week as scheduled


Critical care attestation.: 


If time is entered above; I have spent that time in minutes in the direct care 

of this critically ill patient, excluding procedure time.








ED Disposition


Clinical Impression: 


 Tension headache





Asthma exacerbation


Qualifiers:


 Asthma severity: mild Asthma persistence: intermittent Qualified Code(s): 

J45.21 - Mild intermittent asthma with (acute) exacerbation





Disposition:  TO HOME OR SELFCARE


Is pt being admited?: No


Condition: Stable


Instructions:  Tension Headache, Adult, Asthma, Adult


Prescriptions: 


methylPREDNISolone [Medrol 4MG DOSEPAK (21 tabs)] 4 mg PO UNK #1 tab.ds.pk


Albuterol Sulfate [Proventil Hfa] 6.7 gm IH QID PRN #1 hfa.aer.ad


 PRN Reason: Shortness Of Breath


Referrals: 


[facility] [Referring] - 11/10/20


Forms:  Work/School Release Form(ED)

## 2021-03-28 ENCOUNTER — HOSPITAL ENCOUNTER (OUTPATIENT)
Dept: HOSPITAL 5 - ED | Age: 26
Setting detail: OBSERVATION
LOS: 1 days | Discharge: HOME | End: 2021-03-29
Attending: HOSPITALIST | Admitting: INTERNAL MEDICINE
Payer: MEDICAID

## 2021-03-28 DIAGNOSIS — E87.2: ICD-10-CM

## 2021-03-28 DIAGNOSIS — R55: ICD-10-CM

## 2021-03-28 DIAGNOSIS — J45.909: ICD-10-CM

## 2021-03-28 DIAGNOSIS — T79.9XXA: ICD-10-CM

## 2021-03-28 DIAGNOSIS — F17.200: ICD-10-CM

## 2021-03-28 DIAGNOSIS — D72.829: ICD-10-CM

## 2021-03-28 DIAGNOSIS — T78.40XA: Primary | ICD-10-CM

## 2021-03-28 DIAGNOSIS — R10.9: ICD-10-CM

## 2021-03-28 LAB
ALBUMIN SERPL-MCNC: 3.5 G/DL (ref 3.9–5)
ALT SERPL-CCNC: 8 UNITS/L (ref 7–56)
APTT BLD: 29.2 SEC. (ref 24.2–36.6)
BAND NEUTROPHILS # (MANUAL): 0 K/MM3
BILIRUB UR QL STRIP: (no result)
BLOOD UR QL VISUAL: (no result)
BUN SERPL-MCNC: 8 MG/DL (ref 7–17)
BUN SERPL-MCNC: 9 MG/DL (ref 7–17)
BUN/CREAT SERPL: 15 %
BUN/CREAT SERPL: 16 %
CALCIUM SERPL-MCNC: 7.4 MG/DL (ref 8.4–10.2)
CALCIUM SERPL-MCNC: 8.2 MG/DL (ref 8.4–10.2)
HCT VFR BLD CALC: 45.4 % (ref 30.3–42.9)
HEMOLYSIS INDEX: 26
HEMOLYSIS INDEX: 34
HGB BLD-MCNC: 14.9 GM/DL (ref 10.1–14.3)
INR PPP: 1.14 (ref 0.87–1.13)
MCHC RBC AUTO-ENTMCNC: 33 % (ref 30–34)
MCV RBC AUTO: 98 FL (ref 79–97)
MUCOUS THREADS #/AREA URNS HPF: (no result) /HPF
MYELOCYTES # (MANUAL): 0 K/MM3
PH UR STRIP: 6 [PH] (ref 5–7)
PLATELET # BLD: 265 K/MM3 (ref 140–440)
PROMYELOCYTES # (MANUAL): 0 K/MM3
PROT UR STRIP-MCNC: (no result) MG/DL
RBC # BLD AUTO: 4.61 M/MM3 (ref 3.65–5.03)
RBC #/AREA URNS HPF: 3 /HPF (ref 0–6)
TOTAL CELLS COUNTED BLD: 100
UROBILINOGEN UR-MCNC: < 2 MG/DL (ref ?–2)
WBC #/AREA URNS HPF: 1 /HPF (ref 0–6)

## 2021-03-28 PROCEDURE — 84703 CHORIONIC GONADOTROPIN ASSAY: CPT

## 2021-03-28 PROCEDURE — 96376 TX/PRO/DX INJ SAME DRUG ADON: CPT

## 2021-03-28 PROCEDURE — 70450 CT HEAD/BRAIN W/O DYE: CPT

## 2021-03-28 PROCEDURE — 85007 BL SMEAR W/DIFF WBC COUNT: CPT

## 2021-03-28 PROCEDURE — 36415 COLL VENOUS BLD VENIPUNCTURE: CPT

## 2021-03-28 PROCEDURE — 96372 THER/PROPH/DIAG INJ SC/IM: CPT

## 2021-03-28 PROCEDURE — G0378 HOSPITAL OBSERVATION PER HR: HCPCS

## 2021-03-28 PROCEDURE — 82805 BLOOD GASES W/O2 SATURATION: CPT

## 2021-03-28 PROCEDURE — 82962 GLUCOSE BLOOD TEST: CPT

## 2021-03-28 PROCEDURE — 85730 THROMBOPLASTIN TIME PARTIAL: CPT

## 2021-03-28 PROCEDURE — 83036 HEMOGLOBIN GLYCOSYLATED A1C: CPT

## 2021-03-28 PROCEDURE — 80307 DRUG TEST PRSMV CHEM ANLYZR: CPT

## 2021-03-28 PROCEDURE — 80048 BASIC METABOLIC PNL TOTAL CA: CPT

## 2021-03-28 PROCEDURE — 87040 BLOOD CULTURE FOR BACTERIA: CPT

## 2021-03-28 PROCEDURE — 96361 HYDRATE IV INFUSION ADD-ON: CPT

## 2021-03-28 PROCEDURE — G0480 DRUG TEST DEF 1-7 CLASSES: HCPCS

## 2021-03-28 PROCEDURE — 80053 COMPREHEN METABOLIC PANEL: CPT

## 2021-03-28 PROCEDURE — 85025 COMPLETE CBC W/AUTO DIFF WBC: CPT

## 2021-03-28 PROCEDURE — 81001 URINALYSIS AUTO W/SCOPE: CPT

## 2021-03-28 PROCEDURE — 85610 PROTHROMBIN TIME: CPT

## 2021-03-28 PROCEDURE — 96375 TX/PRO/DX INJ NEW DRUG ADDON: CPT

## 2021-03-28 PROCEDURE — 99285 EMERGENCY DEPT VISIT HI MDM: CPT

## 2021-03-28 PROCEDURE — 96365 THER/PROPH/DIAG IV INF INIT: CPT

## 2021-03-28 PROCEDURE — 72125 CT NECK SPINE W/O DYE: CPT

## 2021-03-28 PROCEDURE — 71045 X-RAY EXAM CHEST 1 VIEW: CPT

## 2021-03-28 PROCEDURE — 82140 ASSAY OF AMMONIA: CPT

## 2021-03-28 PROCEDURE — 73620 X-RAY EXAM OF FOOT: CPT

## 2021-03-28 PROCEDURE — 74177 CT ABD & PELVIS W/CONTRAST: CPT

## 2021-03-28 PROCEDURE — 80320 DRUG SCREEN QUANTALCOHOLS: CPT

## 2021-03-28 RX ADMIN — TRIAMCINOLONE ACETONIDE SCH APPLIC: 5 CREAM TOPICAL at 23:51

## 2021-03-28 RX ADMIN — FAMOTIDINE SCH MG: 10 INJECTION, SOLUTION INTRAVENOUS at 23:49

## 2021-03-28 RX ADMIN — OXYCODONE AND ACETAMINOPHEN PRN TAB: 5; 325 TABLET ORAL at 23:49
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
## 2021-03-28 NOTE — XRAY REPORT
CHEST 1 VIEW 3/28/2021 11:24 AM



INDICATION / CLINICAL INFORMATION: Leukocytosis.



COMPARISON: None available.



FINDINGS:



SUPPORT DEVICES: None.

HEART / MEDIASTINUM: No significant abnormality. 

LUNGS / PLEURA: No significant pulmonary or pleural abnormality. No pneumothorax. 



ADDITIONAL FINDINGS: No significant additional findings.



IMPRESSION:

No acute cardiopulmonary abnormality.



Signer Name: Yves Rodas MD 

Signed: 3/28/2021 11:52 AM

Workstation Name: [id number]
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
## 2021-03-29 VITALS — DIASTOLIC BLOOD PRESSURE: 50 MMHG | SYSTOLIC BLOOD PRESSURE: 107 MMHG

## 2021-03-29 LAB
ALBUMIN SERPL-MCNC: 3.7 G/DL (ref 3.9–5)
ALT SERPL-CCNC: 9 UNITS/L (ref 7–56)
BAND NEUTROPHILS # (MANUAL): 0 K/MM3
BUN SERPL-MCNC: 7 MG/DL (ref 7–17)
BUN/CREAT SERPL: 14 %
CALCIUM SERPL-MCNC: 7.9 MG/DL (ref 8.4–10.2)
HCT VFR BLD CALC: 38.2 % (ref 30.3–42.9)
HEMOLYSIS INDEX: 9
HGB BLD-MCNC: 13.1 GM/DL (ref 10.1–14.3)
MCHC RBC AUTO-ENTMCNC: 34 % (ref 30–34)
MCV RBC AUTO: 97 FL (ref 79–97)
MYELOCYTES # (MANUAL): 0 K/MM3
PLATELET # BLD: 198 K/MM3 (ref 140–440)
PROMYELOCYTES # (MANUAL): 0 K/MM3
RBC # BLD AUTO: 3.96 M/MM3 (ref 3.65–5.03)
TOTAL CELLS COUNTED BLD: 100

## 2021-03-29 RX ADMIN — OXYCODONE AND ACETAMINOPHEN PRN TAB: 5; 325 TABLET ORAL at 05:42

## 2021-03-29 RX ADMIN — FAMOTIDINE SCH MG: 10 INJECTION, SOLUTION INTRAVENOUS at 10:13

## 2021-03-29 RX ADMIN — TRIAMCINOLONE ACETONIDE SCH APPLIC: 5 CREAM TOPICAL at 10:13

## 2021-03-29 RX ADMIN — OXYCODONE AND ACETAMINOPHEN PRN TAB: 5; 325 TABLET ORAL at 14:38
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
